# Patient Record
Sex: MALE | Race: WHITE | NOT HISPANIC OR LATINO | Employment: OTHER | RURAL
[De-identification: names, ages, dates, MRNs, and addresses within clinical notes are randomized per-mention and may not be internally consistent; named-entity substitution may affect disease eponyms.]

---

## 2019-03-02 NOTE — PROGRESS NOTES
Elis Dent is a 63 y.o. male.   Problem List  1. COPD  2. Chronic back pain  3. Tobacco smoker   4. Migraine Headache  5. CVA x 2 a  6. History of MI   7. Alcohol abuse     PSHX  -back surgery about 20 years ago   -left arm surgery     SocialHX  -smoke 1 ppd >20 years  -Drinks alcohol about 6 pack a week  -former drug user   -  -26 children  -Worked in construction  -disabled     FamilyHX  -mother - , emphysema, heart disease   -father  -     Pt is 62 yo male who I am seeing for the first time. He is here to establish. He has a history of COPD. He used to see Dr. Garcia but stopped seeing due to not giving him pain medication.  He was taking Lortab.  He does smoke 1 ppd.  He states is back is in pain and he has a headache.I do not have records here today. He has history of CVA and history of MI.  He is currenlty disbaled and lives alone. He has a family history of emphysema fever.   HE has not seen PCP in years. He currently has a .  He states that today his back, his chest, am. He has not seen Cardiologist in years. He lives alone.  His past surgeries include back surgery 20 years ago and left arm surgery. He is .     COPD   This is a new problem. The current episode started more than 1 year ago. The problem occurs constantly. Associated symptoms include arthralgias, chest pain, fatigue, headaches and weakness. Pertinent negatives include no abdominal pain, anorexia, change in bowel habit, chills, congestion, coughing, diaphoresis, fever, joint swelling, myalgias, nausea, neck pain or numbness.        The following portions of the patient's history were reviewed and updated as appropriate: allergies, current medications, past family history, past medical history, past social history, past surgical history and problem list.  Patient Active Problem List   Diagnosis   • Annual physical exam   • General medical examination   • Encounter for screening for diabetes  "mellitus   • Encounter for screening for cardiovascular disorders   • Encounter for screening for endocrine disorder   • Chronic obstructive pulmonary disease (CMS/[facility])   • Tobacco abuse counseling   • Tobacco user   • Dyspnea   • Back pain   • Elevated blood pressure reading   • Alcohol abuse counseling and surveillance   • Alcohol abuse   • Tachycardia     Current Outpatient Medications on File Prior to Visit   Medication Sig Dispense Refill   • [DISCONTINUED] albuterol sulfate  (90 Base) MCG/ACT inhaler Inhale 2 puffs Every 6 (Six) Hours As Needed for Wheezing.       No current facility-administered medications on file prior to visit.        Review of Systems   Constitutional: Positive for activity change and fatigue. Negative for chills, diaphoresis and fever.   HENT: Negative for congestion.    Eyes: Negative.    Respiratory: Positive for chest tightness and shortness of breath. Negative for cough.    Cardiovascular: Positive for chest pain.   Gastrointestinal: Negative.  Negative for abdominal pain, anorexia, change in bowel habit and nausea.   Endocrine: Negative.    Genitourinary: Negative.    Musculoskeletal: Positive for arthralgias, back pain and gait problem. Negative for joint swelling, myalgias and neck pain.   Neurological: Positive for dizziness, weakness, light-headedness and headaches. Negative for numbness.   Psychiatric/Behavioral: Positive for agitation.       Objective    /86   Pulse 107   Temp 98.6 °F (37 °C)   Ht 165.1 cm (65\")   Wt 65 kg (143 lb 3.2 oz)   SpO2 96%   BMI 23.83 kg/m²     EKG  On 3/7/19 showed NSR with septal infarct, age undetermined, abnormal EKG  No results found for any previous visit.       Physical Exam   Constitutional: He is oriented to person, place, and time. He appears well-developed and well-nourished. No distress.   HENT:   Head: Normocephalic and atraumatic.   Right Ear: External ear normal.   Left Ear: External ear normal.   Eyes: Conjunctivae " and EOM are normal. Pupils are equal, round, and reactive to light. Right eye exhibits no discharge. Left eye exhibits no discharge. No scleral icterus.   Neck: Normal range of motion. Neck supple. No JVD present. No tracheal deviation present. No thyromegaly present.   Cardiovascular: Normal rate, regular rhythm and normal heart sounds.   Pulmonary/Chest: Effort normal. No stridor. No respiratory distress. He has wheezes.   Decreased breath sounds ,wheezing in all lung fileds   Abdominal: Soft. He exhibits no distension and no mass. There is no tenderness. There is no guarding.   Musculoskeletal: He exhibits no edema or deformity.        Lumbar back: He exhibits decreased range of motion, tenderness, bony tenderness, pain and spasm.        Back:    Lymphadenopathy:     He has no cervical adenopathy.   Neurological: He is alert and oriented to person, place, and time. He displays normal reflexes. No cranial nerve deficit. Coordination normal.   Skin: Skin is warm and dry. No rash noted. He is not diaphoretic. No erythema. No pallor.   Psychiatric: He has a normal mood and affect. His behavior is normal.   Nursing note and vitals reviewed.      Assessment/Plan   Problems Addressed this Visit        Cardiovascular and Mediastinum    Elevated blood pressure reading    Tachycardia    Relevant Orders    ECG 12 Lead (Completed)       Respiratory    Chronic obstructive pulmonary disease (CMS/HCC) - Primary    Relevant Medications    albuterol sulfate  (90 Base) MCG/ACT inhaler    albuterol (ACCUNEB) 1.25 MG/3ML nebulizer solution    Other Relevant Orders    Ambulatory Referral to Pulmonology (Completed)    Dyspnea    Relevant Orders    XR Chest PA & Lateral    Ambulatory Referral to Pulmonology (Completed)       Nervous and Auditory    Back pain    Relevant Orders    XR Spine Lumbar AP & Lateral       Other    Annual physical exam    General medical examination    Relevant Orders    CBC Auto Differential     Comprehensive Metabolic Panel    Hemoglobin A1c    Lipid Panel    TSH    T4, Free    T3, Free    Vitamin D 25 Hydroxy    Encounter for screening for diabetes mellitus    Encounter for screening for cardiovascular disorders    Encounter for screening for endocrine disorder    Tobacco abuse counseling    Tobacco user    Alcohol abuse counseling and surveillance    Alcohol abuse      Other Visit Diagnoses     History of MI (myocardial infarction)        History of CVA (cerebrovascular accident)          -recommend labwork  -need records from DR. Garcia previous PC  -advised pt will not fill out controlled medication such as Lortab until more information is provided. In meantime will get x-ray of lumbar spine  -dyspnea/COPD - will get chest x-ray . Will also refer to DR. [place] for Pulmonary function test. Gave prescription for nebulizer and solution today.    -recheck in 2-4 weeks  -tachycardia - EKG today   -pt allergic to aspirin   -for chest pain abnormal EKG recommend pt go to ER or call 911 if symptom severe. EKG showed NSR with septal infarct. Recommend Cardiology referral. Dr. Malhotra consultation appreciated  -counseld pt to quit alcohol and tobacco cessation >5 minutes. Gave information to quit smoking. Gave prescription for nicotine patch   -alcohol abuse -gave information on alcohol abuse   -advised to go to ER of call 911 if symptoms worrisome or severe  - he states he has high blood pressure and it is elevated today. Will start on lisionpril-HCTZ 20-12.5 mg daily - recommend low sodium diet . Gave information provided   -advised pt to be safe and call with any questions or concerns. All qusetion addressed today         This document has been electronically signed by Toby Harrell MD on March 7, 2019 10:55 AM

## 2019-03-07 ENCOUNTER — OFFICE VISIT (OUTPATIENT)
Dept: FAMILY MEDICINE CLINIC | Facility: CLINIC | Age: 64
End: 2019-03-07

## 2019-03-07 VITALS
WEIGHT: 143.2 LBS | HEIGHT: 65 IN | DIASTOLIC BLOOD PRESSURE: 86 MMHG | OXYGEN SATURATION: 96 % | SYSTOLIC BLOOD PRESSURE: 180 MMHG | TEMPERATURE: 98.6 F | BODY MASS INDEX: 23.86 KG/M2 | HEART RATE: 107 BPM

## 2019-03-07 DIAGNOSIS — I25.2 HISTORY OF MI (MYOCARDIAL INFARCTION): ICD-10-CM

## 2019-03-07 DIAGNOSIS — R00.0 TACHYCARDIA: ICD-10-CM

## 2019-03-07 DIAGNOSIS — R06.00 DYSPNEA, UNSPECIFIED TYPE: ICD-10-CM

## 2019-03-07 DIAGNOSIS — Z00.00 GENERAL MEDICAL EXAMINATION: ICD-10-CM

## 2019-03-07 DIAGNOSIS — Z72.0 TOBACCO USER: ICD-10-CM

## 2019-03-07 DIAGNOSIS — J44.9 CHRONIC OBSTRUCTIVE PULMONARY DISEASE, UNSPECIFIED COPD TYPE (HCC): ICD-10-CM

## 2019-03-07 DIAGNOSIS — R07.9 CHEST PAIN, UNSPECIFIED TYPE: Primary | ICD-10-CM

## 2019-03-07 DIAGNOSIS — R94.31 ABNORMAL EKG: ICD-10-CM

## 2019-03-07 DIAGNOSIS — F10.10 ALCOHOL ABUSE: ICD-10-CM

## 2019-03-07 DIAGNOSIS — Z86.73 HISTORY OF CVA (CEREBROVASCULAR ACCIDENT): ICD-10-CM

## 2019-03-07 DIAGNOSIS — Z13.29 ENCOUNTER FOR SCREENING FOR ENDOCRINE DISORDER: ICD-10-CM

## 2019-03-07 DIAGNOSIS — M54.9 BACK PAIN, UNSPECIFIED BACK LOCATION, UNSPECIFIED BACK PAIN LATERALITY, UNSPECIFIED CHRONICITY: ICD-10-CM

## 2019-03-07 DIAGNOSIS — Z00.00 ANNUAL PHYSICAL EXAM: ICD-10-CM

## 2019-03-07 DIAGNOSIS — Z71.41 ALCOHOL ABUSE COUNSELING AND SURVEILLANCE: ICD-10-CM

## 2019-03-07 DIAGNOSIS — R03.0 ELEVATED BLOOD PRESSURE READING: ICD-10-CM

## 2019-03-07 DIAGNOSIS — Z13.1 ENCOUNTER FOR SCREENING FOR DIABETES MELLITUS: ICD-10-CM

## 2019-03-07 DIAGNOSIS — Z13.6 ENCOUNTER FOR SCREENING FOR CARDIOVASCULAR DISORDERS: ICD-10-CM

## 2019-03-07 DIAGNOSIS — Z71.6 TOBACCO ABUSE COUNSELING: ICD-10-CM

## 2019-03-07 PROCEDURE — 99214 OFFICE O/P EST MOD 30 MIN: CPT | Performed by: FAMILY MEDICINE

## 2019-03-07 PROCEDURE — 93010 ELECTROCARDIOGRAM REPORT: CPT | Performed by: INTERNAL MEDICINE

## 2019-03-07 PROCEDURE — 93005 ELECTROCARDIOGRAM TRACING: CPT | Performed by: FAMILY MEDICINE

## 2019-03-07 RX ORDER — NICOTINE 21 MG/24HR
1 PATCH, TRANSDERMAL 24 HOURS TRANSDERMAL EVERY 24 HOURS
Qty: 30 PATCH | Refills: 3 | Status: SHIPPED | OUTPATIENT
Start: 2019-03-07

## 2019-03-07 RX ORDER — ALBUTEROL SULFATE 90 UG/1
2 AEROSOL, METERED RESPIRATORY (INHALATION) EVERY 6 HOURS PRN
COMMUNITY
End: 2019-03-07 | Stop reason: SDUPTHER

## 2019-03-07 RX ORDER — ALBUTEROL SULFATE 1.25 MG/3ML
1 SOLUTION RESPIRATORY (INHALATION) EVERY 6 HOURS PRN
Qty: 1 VIAL | Refills: 3 | Status: SHIPPED | OUTPATIENT
Start: 2019-03-07 | End: 2019-07-10 | Stop reason: SDUPTHER

## 2019-03-07 RX ORDER — ALBUTEROL SULFATE 90 UG/1
2 AEROSOL, METERED RESPIRATORY (INHALATION) EVERY 6 HOURS PRN
Qty: 1 INHALER | Refills: 3 | Status: SHIPPED | OUTPATIENT
Start: 2019-03-07 | End: 2019-07-10 | Stop reason: SDUPTHER

## 2019-03-07 RX ORDER — LISINOPRIL AND HYDROCHLOROTHIAZIDE 20; 12.5 MG/1; MG/1
1 TABLET ORAL DAILY
Qty: 30 TABLET | Refills: 3 | Status: SHIPPED | OUTPATIENT
Start: 2019-03-07 | End: 2019-04-22

## 2019-03-07 NOTE — PATIENT INSTRUCTIONS
Alcohol Withdrawal  Alcohol withdrawal is a group of symptoms that can develop when a person who drinks heavily and regularly stops drinking or drinks less.  What are the causes?  Heavy and regular drinking can cause chemicals that send signals from the brain to the body (neurotransmitters) to deactivate. Alcohol withdrawal develops when deactivated neurotransmitters reactivate because a person stops drinking or drinks less.  What increases the risk?  The more a person drinks and the longer he or she drinks, the greater the risk of alcohol withdrawal. Severe withdrawal is more likely to develop in someone who:  · Had severe alcohol withdrawal in the past.  · Had a seizure during a previous episode of alcohol withdrawal.  · Is elderly.  · Is pregnant.  · Has been abusing drugs.  · Has other medical problems, including:  ? Infection.  ? Heart, lung, or liver disease.  ? Seizures.  ? Mental health problems.    What are the signs or symptoms?  Symptoms of this condition can be mild to moderate, or they can be severe.  Mild to moderate symptoms may include:  · Fatigue.  · Nightmares.  · Trouble sleeping.  · Depression.  · Anxiety.  · Inability to think clearly.  · Mood swings.  · Irritability.  · Loss of appetite.  · Nausea or vomiting.  · Clammy skin.  · Extreme sweating.  · Rapid heartbeat.  · Shakiness.  · Uncontrollable shaking (tremor).    Severe symptoms may include:  · Fever.  · Seizures.  · Severe confusion.  · Feeling or seeing things that are not there (hallucinations).    Symptoms usually begin within eight hours after a person stops drinking or drinks less. They can last for weeks.  How is this diagnosed?  Alcohol withdrawal is diagnosed with a medical history and physical exam. Sometimes, urine and blood tests are also done.  How is this treated?  Treatment may involve:  · Monitoring blood pressure, pulse, and breathing.  · Getting fluids through an IV tube.  · Medicine to reduce anxiety.  · Medicine to  prevent or control seizures.  · Multivitamins and B vitamins.  · Having a health care provider check on you daily.    If symptoms are moderate to severe or if there is a risk of severe withdrawal, treatment may be done at a hospital or treatment center.  Follow these instructions at home:  · Take medicines and vitamin supplements only as directed by your health care provider.  · Do not drink alcohol.  · Have someone stay with you or be available if you need help.  · Drink enough fluid to keep your urine clear or pale yellow.  · Consider joining a 12-step program or another alcohol support group.  Contact a health care provider if:  · Your symptoms get worse or do not go away.  · You cannot keep food or water in your stomach.  · You are struggling with not drinking alcohol.  · You cannot stop drinking alcohol.  Get help right away if:  · You have an irregular heartbeat.  · You have chest pain.  · You have trouble breathing.  · You have symptoms of severe withdrawal, such as:  ? A fever.  ? Seizures.  ? Severe confusion.  ? Hallucinations.  This information is not intended to replace advice given to you by your health care provider. Make sure you discuss any questions you have with your health care provider.  Document Released: 09/27/2006 Document Revised: 04/26/2017 Document Reviewed: 10/06/2015  [employer] Interactive Patient Education © 2018 [employer]  Alcohol Abuse and Nutrition  Alcohol abuse is any pattern of alcohol consumption that harms your health, relationships, or work. Alcohol abuse can affect how your body breaks down and absorbs nutrients from food by causing your liver to work abnormally. Additionally, many people who abuse alcohol do not eat enough carbohydrates, protein, fat, vitamins, and minerals. This can cause poor nutrition (malnutrition) and a lack of nutrients (nutrient deficiencies), which can lead to further complications.  Nutrients that are commonly lacking (deficient) among people who  abuse alcohol include:  · Vitamins.  ? Vitamin A. This is stored in your liver. It is important for your vision, metabolism, and ability to fight off infections (immunity).  ? B vitamins. These include vitamins such as folate, thiamin, and niacin. These are important in new cell growth and maintenance.  ? Vitamin C. This plays an important role in iron absorption, wound healing, and immunity.  ? Vitamin D. This is produced by your liver, but you can also get vitamin D from food. Vitamin D is necessary for your body to absorb and use calcium.  · Minerals.  ? Calcium. This is important for your bones and your heart and blood vessel (cardiovascular) function.  ? Iron. This is important for blood, muscle, and nervous system functioning.  ? Magnesium. This plays an important role in muscle and nerve function, and it helps to control blood sugar and blood pressure.  ? Zinc. This is important for the normal function of your nervous system and digestive system (gastrointestinal tract).    Nutrition is an essential component of therapy for alcohol abuse. Your health care provider or dietitian will work with you to design a plan that can help restore nutrients to your body and prevent potential complications.  What is my plan?  Your dietitian may develop a specific diet plan that is based on your condition and any other complications you may have. A diet plan will commonly include:  · A balanced diet.  ? Grains: 6-8 oz per day.  ? Vegetables: 2-3 cups per day.  ? Fruits: 1-2 cups per day.  ? Meat and other protein: 5-6 oz per day.  ? Dairy: 2-3 cups per day.  · Vitamin and mineral supplements.    What do I need to know about alcohol and nutrition?  · Consume foods that are high in antioxidants, such as grapes, berries, nuts, green tea, and dark green and orange vegetables. This can help to counteract some of the stress that is placed on your liver by consuming alcohol.  · Avoid food and drinks that are high in fat and sugar.  Foods such as sugared soft drinks, salty snack foods, and candy contain empty calories. This means that they lack important nutrients such as protein, fiber, and vitamins.  · Eat frequent meals and snacks. Try to eat 5-6 small meals each day.  · Eat a variety of fresh fruits and vegetables each day. This will help you get plenty of water, fiber, and vitamins in your diet.  · Drink plenty of water and other clear fluids. Try to drink at least 48-64 oz (1.5-2 L) of water per day.  · If you are a vegetarian, eat a variety of protein-rich foods. Pair whole grains with plant-based proteins at meals and snacks to obtain the greatest nutrient benefit from your food. For example, eat rice with beans, put peanut butter on whole-grain toast, or eat oatmeal with sunflower seeds.  · Soak beans and whole grains overnight before cooking. This can help your body to absorb the nutrients more easily.  · Include foods fortified with vitamins and minerals in your diet. Commonly fortified foods include milk, orange juice, cereal, and bread.  · If you are malnourished, your dietitian may recommend a high-protein, high-calorie diet. This may include:  ? 2,000-3,000 calories (kilocalories) per day.  ?  grams of protein per day.  · Your health care provider may recommend a complete nutritional supplement beverage. This can help to restore calories, protein, and vitamins to your body. Depending on your condition, you may be advised to consume this instead of or in addition to meals.  · Limit your intake of caffeine. Replace drinks like coffee and black tea with decaffeinated coffee and herbal tea.  · Eat a variety of foods that are high in omega fatty acids. These include fish, nuts and seeds, and soybeans. These foods may help your liver to recover and may also stabilize your mood.  · Certain medicines may cause changes in your appetite, taste, and weight. Work with your health care provider and dietitian to make any adjustments to  your medicines and diet plan.  · Include other healthy lifestyle choices in your daily routine.  ? Be physically active.  ? Get enough sleep.  ? Spend time doing activities that you enjoy.  · If you are unable to take in enough food and calories by mouth, your health care provider may recommend a feeding tube. This is a tube that passes through your nose and throat, directly into your stomach. Nutritional supplement beverages can be given to you through the feeding tube to help you get the nutrients you need.  · Take vitamin or mineral supplements as recommended by your health care provider.  What foods can I eat?  Grains  Enriched pasta. Enriched rice. Fortified whole-grain bread. Fortified whole-grain cereal. Barley. Brown rice. Quinoa. Millet.  Vegetables  All fresh, frozen, and canned vegetables. Spinach. Kale. Artichoke. Carrots. Winter squash and pumpkin. Sweet potatoes. Broccoli. Cabbage. Cucumbers. Tomatoes. Sweet peppers. Green beans. Peas. Corn.  Fruits  All fresh and frozen fruits. Berries. Grapes. [place]j. Papaya. Guava. Cherries. Apples. Bananas. Peaches. Plums. Pineapple. Watermelon. Cantaloupe. Oranges. Avocado.  Meats and Other Protein Sources  Beef liver. Lean beef. Pork. Fresh and canned chicken. Fresh fish. Oysters. Sardines. Canned tuna. Shrimp. Eggs with yolks. Nuts and seeds. Peanut butter. Beans and lentils. Soybeans. Tofu.  Dairy  Whole, low-fat, and nonfat milk. Whole, low-fat, and nonfat yogurt. Cottage cheese. Sour cream. Hard and soft cheeses.  Beverages  Water. Herbal tea. Decaffeinated coffee. Decaffeinated green tea. 100% fruit juice. 100% vegetable juice. Instant breakfast shakes.  Condiments  Ketchup. Mayonnaise. Mustard. Salad dressing. Barbecue sauce.  Sweets and Desserts  Sugar-free ice cream. Sugar-free pudding. Sugar-free gelatin.  Fats and Oils  Butter. Vegetable oil, flaxseed oil, olive oil, and walnut oil.  Other  Complete nutrition shakes. Protein bars. Sugar-free gum.  The  items listed above may not be a complete list of recommended foods or beverages. Contact your dietitian for more options.  What foods are not recommended?  Grains  Sugar-sweetened breakfast cereals. Flavored instant oatmeal. Fried breads.  Vegetables  Breaded or deep-fried vegetables.  Fruits  Dried fruit with added sugar. Candied fruit. Canned fruit in syrup.  Meats and Other Protein Sources  Breaded or deep-fried meats.  Dairy  Flavored milks. Fried cheese curds or fried cheese sticks.  Beverages  Alcohol. Sugar-sweetened soft drinks. Sugar-sweetened tea. Caffeinated coffee and tea.  Condiments  Sugar. Honey. Agave nectar. Molasses.  Sweets and Desserts  Chocolate. Cake. Cookies. Candy.  Other  Potato chips. Pretzels. Salted nuts. Candied nuts.  The items listed above may not be a complete list of foods and beverages to avoid. Contact your dietitian for more information.  This information is not intended to replace advice given to you by your health care provider. Make sure you discuss any questions you have with your health care provider.  Document Released: 10/12/2006 Document Revised: 04/26/2017 Document Reviewed: 07/21/2015  [employer] Interactive Patient Education © 2018 [employer]    Alcohol Intoxication  Alcohol intoxication occurs when a person no longer thinks clearly or functions well (becomes impaired) after drinking alcohol. Intoxication can occur with even one drink. The level of impairment depends on:  · The amount of alcohol the person had.  · The person's age, gender, and weight.  · How often the person drinks.  · Whether the person has other medical conditions, such as diabetes, seizures, or a heart condition.    Alcohol intoxication can range in severity from mild to severe. The condition can be dangerous, especially when caused by drinking large amounts of alcohol in a short period of time (binge drinking) or if the person also took certain prescription medicines or recreational drugs.  What  are the signs or symptoms?  Symptoms of mild alcohol intoxication include:  · Feeling relaxed or sleepy.  · Mild difficulty with:  ? Coordination.  ? Speech.  ? Memory.  ? Attention.    Symptoms of moderate alcohol intoxication include:  · Extreme emotions, such as anger or sadness.  · Moderate difficulty with:  ? Coordination.  ? Speech.  ? Memory.  ? Attention.    Symptoms of severe alcohol intoxication include:  · Passing out.  · Vomiting.  · Confusion.  · Slow breathing.  · Coma.  · Severe difficulty with:  ? Coordination.  ? Speech.  ? Memory.  ? Attention.    Intoxication, especially in people who are not exposed to alcohol often can progress from mild to severe quickly, and may even cause coma or death.  How is this diagnosed?  This condition may be diagnosed based on:  · A medical history.  · A physical exam.  · A blood test that measures the concentration of alcohol in the blood (blood alcohol content, or RU).  · Whether there is a smell of alcohol on the breath.    Your health care provider will ask you how much alcohol you drank and what kind of alcohol you had.  How is this treated?  Usually, treatment is not needed for this condition. Most of the effects of alcohol are temporary and go away as the alcohol naturally leaves the body. Your health care provider may recommend monitoring until the alcohol level starts to drop and it is safe to go home. You may also get fluids through an IV tube to help prevent dehydration. If the intoxication is severe, a breathing machine called a ventilator may be needed to support your breathing.  Follow these instructions at home:  · Do not drive after drinking alcohol.  · Have someone stay with you while you are intoxicated. You should not be left alone.  · Stay hydrated. Drink enough fluid to keep your urine clear or pale yellow.  · Avoid caffeine because it can dehydrate you.  · Take over-the-counter and prescription medicines only as told by your health care  provider.  How is this prevented?  To prevent alcohol intoxication:  · Limit alcohol intake to no more than 1 drink a day for nonpregnant women and 2 drinks a day for men. One drink equals 12 oz of beer, 5 oz of wine, or 1½ oz of hard liquor.  · Do not drink alcohol on an empty stomach.  · Avoid drinking alcohol if:  ? You are under the legal drinking age.  ? You are pregnant or may be pregnant.  ? You are taking medicines that should not be taken with alcohol.  ? Your drinking causes your medical condition to get worse.  ? You need to drive or perform activities that require your attention.  ? You have substance use disorder.    To prevent potentially serious complications of alcohol intoxication, seek immediate medical care if you or someone you know has signs of moderate or severe alcohol intoxication. These include:  · Moderate or severe difficulty with:  ? Coordination.  ? Speech.  ? Memory.  ? Attention.  · Passing out.  · Confusion.  · Vomiting.    Do not leave someone alone if he or she is intoxicated.  Contact a health care provider if:  · You do not feel better after a few days.  · You are having problems at work, at school, or at home due to drinking.  Get help right away if:  · You become shaky when you try to stop drinking.  · You shake uncontrollably (have a seizure).  · You vomit blood. Blood in vomit may look bright red, or it may look like coffee grounds.  · You have blood in your stool. Blood in stool may be bright red, or it may make stool appear black and tarry and make it smell bad.  · You become light-headed or you faint.  If you ever feel like you may hurt yourself or others, or have thoughts about taking your own life, get help right away. You can go to your nearest emergency department or call:  · Your local emergency services (911 in the [place]  · A suicide crisis helpline, such as the National Suicide Prevention Lifeline at 1-421.288.6956. This is open 24 hours a day.    This information  is not intended to replace advice given to you by your health care provider. Make sure you discuss any questions you have with your health care provider.  Document Released: 09/27/2006 Document Revised: 09/02/2017 Document Reviewed: 09/02/2017  [employer] Interactive Patient Education © 2018 [employer]  Lisinopril tablets  What is this medicine?  LISINOPRIL (lyse IN oh pril) is an ACE inhibitor. This medicine is used to treat high blood pressure and heart failure. It is also used to protect the heart immediately after a heart attack.  This medicine may be used for other purposes; ask your health care provider or pharmacist if you have questions.  COMMON BRAND NAME(S): Prinivil, Zestril  What should I tell my health care provider before I take this medicine?  They need to know if you have any of these conditions:  -diabetes  -heart or blood vessel disease  -kidney disease  -low blood pressure  -previous swelling of the tongue, face, or lips with difficulty breathing, difficulty swallowing, hoarseness, or tightening of the throat  -an unusual or allergic reaction to lisinopril, other ACE inhibitors, insect venom, foods, dyes, or preservatives  -pregnant or trying to get pregnant  -breast-feeding  How should I use this medicine?  Take this medicine by mouth with a glass of water. Follow the directions on your prescription label. You may take this medicine with or without food. If it upsets your stomach, take it with food. Take your medicine at regular intervals. Do not take it more often than directed. Do not stop taking except on your doctor's advice.  Talk to your pediatrician regarding the use of this medicine in children. Special care may be needed. While this drug may be prescribed for children as young as 6 years of age for selected conditions, precautions do apply.  Overdosage: If you think you have taken too much of this medicine contact a poison control center or emergency room at once.  NOTE: This medicine  is only for you. Do not share this medicine with others.  What if I miss a dose?  If you miss a dose, take it as soon as you can. If it is almost time for your next dose, take only that dose. Do not take double or extra doses.  What may interact with this medicine?  Do not take this medicine with any of the following medications:  -hymenoptera venom  -sacubitril; valsartan  This medicines may also interact with the following medications:  -aliskiren  -angiotensin receptor blockers, like losartan or valsartan  -certain medicines for diabetes  -diuretics  -everolimus  -gold compounds  -lithium  -NSAIDs, medicines for pain and inflammation, like ibuprofen or naproxen  -potassium salts or supplements  -salt substitutes  -sirolimus  -temsirolimus  This list may not describe all possible interactions. Give your health care provider a list of all the medicines, herbs, non-prescription drugs, or dietary supplements you use. Also tell them if you smoke, drink alcohol, or use illegal drugs. Some items may interact with your medicine.  What should I watch for while using this medicine?  Visit your doctor or health care professional for regular check ups. Check your blood pressure as directed. Ask your doctor what your blood pressure should be, and when you should contact him or her.  Do not treat yourself for coughs, colds, or pain while you are using this medicine without asking your doctor or health care professional for advice. Some ingredients may increase your blood pressure.  Women should inform their doctor if they wish to become pregnant or think they might be pregnant. There is a potential for serious side effects to an unborn child. Talk to your health care professional or pharmacist for more information.  Check with your doctor or health care professional if you get an attack of severe diarrhea, nausea and vomiting, or if you sweat a lot. The loss of too much body fluid can make it dangerous for you to take this  medicine.  You may get drowsy or dizzy. Do not drive, use machinery, or do anything that needs mental alertness until you know how this drug affects you. Do not stand or sit up quickly, especially if you are an older patient. This reduces the risk of dizzy or fainting spells. Alcohol can make you more drowsy and dizzy. Avoid alcoholic drinks.  Avoid salt substitutes unless you are told otherwise by your doctor or health care professional.  What side effects may I notice from receiving this medicine?  Side effects that you should report to your doctor or health care professional as soon as possible:  -allergic reactions like skin rash, itching or hives, swelling of the hands, feet, face, lips, throat, or tongue  -breathing problems  -signs and symptoms of kidney injury like trouble passing urine or change in the amount of urine  -signs and symptoms of increased potassium like muscle weakness; chest pain; or fast, irregular heartbeat  -signs and symptoms of liver injury like dark yellow or brown urine; general ill feeling or flu-like symptoms; light-colored stools; loss of appetite; nausea; right upper belly pain; unusually weak or tired; yellowing of the eyes or skin  -signs and symptoms of low blood pressure like dizziness; feeling faint or lightheaded, falls; unusually weak or tired  -stomach pain with or without nausea and vomiting  Side effects that usually do not require medical attention (report to your doctor or health care professional if they continue or are bothersome):  -changes in taste  -cough  -dizziness  -fever  -headache  -sensitivity to light  This list may not describe all possible side effects. Call your doctor for medical advice about side effects. You may report side effects to FDA at 8-537-FDA-5936.  Where should I keep my medicine?  Keep out of the reach of children.  Store at room temperature between 15 and 30 degrees C (59 and 86 degrees F). Protect from moisture. Keep container tightly  "closed. Throw away any unused medicine after the expiration date.  NOTE: This sheet is a summary. It may not cover all possible information. If you have questions about this medicine, talk to your doctor, pharmacist, or health care provider.  © 2018 [employer] (2017-02-06 12:52:35)    Heart-Healthy Eating Plan  Heart-healthy meal planning includes:  · Limiting unhealthy fats.  · Increasing healthy fats.  · Making other small dietary changes.    You may need to talk with your doctor or a diet specialist (dietitian) to create an eating plan that is right for you.  What types of fat should I choose?  · Choose healthy fats. These include olive oil and canola oil, flaxseeds, walnuts, almonds, and seeds.  · Eat more omega-3 fats. These include salmon, mackerel, sardines, tuna, flaxseed oil, and ground flaxseeds. Try to eat fish at least twice each week.  · Limit saturated fats.  ? Saturated fats are often found in animal products, such as meats, butter, and cream.  ? Plant sources of saturated fats include palm oil, palm kernel oil, and coconut oil.  · Avoid foods with partially hydrogenated oils in them. These include stick margarine, some tub margarines, cookies, crackers, and other baked goods. These contain trans fats.  What general guidelines do I need to follow?  · Check food labels carefully. Identify foods with trans fats or high amounts of saturated fat.  · Fill one half of your plate with vegetables and green salads. Eat 4-5 servings of vegetables per day. A serving of vegetables is:  ? 1 cup of raw leafy vegetables.  ? ½ cup of raw or cooked cut-up vegetables.  ? ½ cup of vegetable juice.  · Fill one fourth of your plate with whole grains. Look for the word \"whole\" as the first word in the ingredient list.  · Fill one fourth of your plate with lean protein foods.  · Eat 4-5 servings of fruit per day. A serving of fruit is:  ? One medium whole fruit.  ? ¼ cup of dried fruit.  ? ½ cup of fresh, " frozen, or canned fruit.  ? ½ cup of 100% fruit juice.  · Eat more foods that contain soluble fiber. These include apples, broccoli, carrots, beans, peas, and barley. Try to get 20-30 g of fiber per day.  · Eat more home-cooked food. Eat less restaurant, buffet, and fast food.  · Limit or avoid alcohol.  · Limit foods high in starch and sugar.  · Avoid fried foods.  · Avoid frying your food. Try baking, boiling, grilling, or broiling it instead. You can also reduce fat by:  ? Removing the skin from poultry.  ? Removing all visible fats from meats.  ? Skimming the fat off of stews, soups, and gravies before serving them.  ? Steaming vegetables in water or broth.  · Lose weight if you are overweight.  · Eat 4-5 servings of nuts, legumes, and seeds per week:  ? One serving of dried beans or legumes equals ½ cup after being cooked.  ? One serving of nuts equals 1½ ounces.  ? One serving of seeds equals ½ ounce or one tablespoon.  · You may need to keep track of how much salt or sodium you eat. This is especially true if you have high blood pressure. Talk with your doctor or dietitian to get more information.  What foods can I eat?  Grains  Breads, including Arabic, white, jorge, wheat, raisin, rye, oatmeal, and Italian. Tortillas that are neither fried nor made with lard or trans fat. Low-fat rolls, including hotdog and hamburger buns and English muffins. Biscuits. Muffins. Waffles. Pancakes. Light popcorn. Whole-grain cereals. Flatbread. [place] toast. Pretzels. Breadsticks. Rusks. Low-fat snacks. Low-fat crackers, including oyster, saltine, matzo, doug, animal, and rye. Rice and pasta, including brown rice and pastas that are made with whole wheat.  Vegetables  All vegetables.  Fruits  All fruits, but limit coconut.  Meats and Other Protein Sources  Lean, well-trimmed beef, veal, pork, and lamb. Chicken and turkey without skin. All fish and shellfish. Wild duck, rabbit, pheasant, and venison. Egg whites or  low-cholesterol egg substitutes. Dried beans, peas, lentils, and tofu. Seeds and most nuts.  Dairy  Low-fat or nonfat cheeses, including ricotta, string, and mozzarella. Skim or 1% milk that is liquid, powdered, or evaporated. Buttermilk that is made with low-fat milk. Nonfat or low-fat yogurt.  Beverages  Mineral water. Diet carbonated beverages.  Sweets and Desserts  Sherbets and fruit ices. Honey, jam, marmalade, jelly, and syrups. Meringues and gelatins. Pure sugar candy, such as hard candy, jelly beans, gumdrops, mints, marshmallows, and small amounts of dark chocolate. Uriel food cake.  Eat all sweets and desserts in moderation.  Fats and Oils  Nonhydrogenated (trans-free) margarines. Vegetable oils, including soybean, sesame, sunflower, olive, peanut, safflower, corn, canola, and cottonseed. Salad dressings or mayonnaise made with a vegetable oil. Limit added fats and oils that you use for cooking, baking, salads, and as spreads.  Other  Cocoa powder. Coffee and tea. All seasonings and condiments.  The items listed above may not be a complete list of recommended foods or beverages. Contact your dietitian for more options.  What foods are not recommended?  Grains  Breads that are made with saturated or trans fats, oils, or whole milk. Croissants. Butter rolls. Cheese breads. Sweet rolls. Donuts. Buttered popcorn. Chow mein noodles. High-fat crackers, such as cheese or butter crackers.  Meats and Other Protein Sources  Fatty meats, such as hotdogs, short ribs, sausage, spareribs, reddy, rib eye roast or steak, and mutton. High-fat deli meats, such as salami and bologna. Caviar. Domestic duck and goose. Organ meats, such as kidney, liver, sweetbreads, and heart.  Dairy  Cream, sour cream, cream cheese, and creamed cottage cheese. Whole-milk cheeses, including blue (melchor), [place] Luis Armando, Brie, Philip, American, Havarti, Swiss, cheddar, Camembert, and Beryl. Whole or 2% milk that is liquid, evaporated, or  condensed. Whole buttermilk. Cream sauce or high-fat cheese sauce. Yogurt that is made from whole milk.  Beverages  Regular sodas and juice drinks with added sugar.  Sweets and Desserts  Frosting. Pudding. Cookies. Cakes other than lindy food cake. Candy that has milk chocolate or white chocolate, hydrogenated fat, butter, coconut, or unknown ingredients. Buttered syrups. Full-fat ice cream or ice cream drinks.  Fats and Oils  Gravy that has suet, meat fat, or shortening. Cocoa butter, hydrogenated oils, palm oil, coconut oil, palm kernel oil. These can often be found in baked products, candy, fried foods, nondairy creamers, and whipped toppings. Solid fats and shortenings, including reddy fat, salt pork, lard, and butter. Nondairy cream substitutes, such as coffee creamers and sour cream substitutes. Salad dressings that are made of unknown oils, cheese, or sour cream.  The items listed above may not be a complete list of foods and beverages to avoid. Contact your dietitian for more information.  This information is not intended to replace advice given to you by your health care provider. Make sure you discuss any questions you have with your health care provider.  Document Released: 06/18/2013 Document Revised: 05/25/2017 Document Reviewed: 06/11/2015  [employer] Interactive Patient Education © 2018 [employer]    Low-Sodium Eating Plan  Sodium, which is an element that makes up salt, helps you maintain a healthy balance of fluids in your body. Too much sodium can increase your blood pressure and cause fluid and waste to be held in your body.  Your health care provider or dietitian may recommend following this plan if you have high blood pressure (hypertension), kidney disease, liver disease, or heart failure. Eating less sodium can help lower your blood pressure, reduce swelling, and protect your heart, liver, and kidneys.  What are tips for following this plan?  General guidelines  · Most people on this plan  "should limit their sodium intake to 1,500-2,000 mg (milligrams) of sodium each day.  Reading food labels  · The Nutrition Facts label lists the amount of sodium in one serving of the food. If you eat more than one serving, you must multiply the listed amount of sodium by the number of servings.  · Choose foods with less than 140 mg of sodium per serving.  · Avoid foods with 300 mg of sodium or more per serving.  Shopping  · Look for lower-sodium products, often labeled as \"low-sodium\" or \"no salt added.\"  · Always check the sodium content even if foods are labeled as \"unsalted\" or \"no salt added\".  · Buy fresh foods.  ? Avoid canned foods and premade or frozen meals.  ? Avoid canned, cured, or processed meats  · Buy breads that have less than 80 mg of sodium per slice.  Cooking  · Eat more home-cooked food and less restaurant, buffet, and fast food.  · Avoid adding salt when cooking. Use salt-free seasonings or herbs instead of table salt or sea salt. Check with your health care provider or pharmacist before using salt substitutes.  · Cook with plant-based oils, such as canola, sunflower, or olive oil.  Meal planning  · When eating at a restaurant, ask that your food be prepared with less salt or no salt, if possible.  · Avoid foods that contain MSG (monosodium glutamate). MSG is sometimes added to Chinese food, bouillon, and some canned foods.  What foods are recommended?  The items listed may not be a complete list. Talk with your dietitian about what dietary choices are best for you.  Grains  Low-sodium cereals, including oats, puffed wheat and rice, and shredded wheat. Low-sodium crackers. Unsalted rice. Unsalted pasta. Low-sodium bread. Whole-grain breads and whole-grain pasta.  Vegetables  Fresh or frozen vegetables. \"No salt added\" canned vegetables. \"No salt added\" tomato sauce and paste. Low-sodium or reduced-sodium tomato and vegetable juice.  Fruits  Fresh, frozen, or canned fruit. Fruit juice.  Meats and " other protein foods  Fresh or frozen (no salt added) meat, poultry, seafood, and fish. Low-sodium canned tuna and salmon. Unsalted nuts. Dried peas, beans, and lentils without added salt. Unsalted canned beans. Eggs. Unsalted nut butters.  Dairy  Milk. Soy milk. Cheese that is naturally low in sodium, such as ricotta cheese, fresh mozzarella, or Swiss cheese Low-sodium or reduced-sodium cheese. Cream cheese. Yogurt.  Fats and oils  Unsalted butter. Unsalted margarine with no trans fat. Vegetable oils such as canola or olive oils.  Seasonings and other foods  Fresh and dried herbs and spices. Salt-free seasonings. Low-sodium mustard and ketchup. Sodium-free salad dressing. Sodium-free light mayonnaise. Fresh or refrigerated horseradish. Lemon juice. Vinegar. Homemade, reduced-sodium, or low-sodium soups. Unsalted popcorn and pretzels. Low-salt or salt-free chips.  What foods are not recommended?  The items listed may not be a complete list. Talk with your dietitian about what dietary choices are best for you.  Grains  Instant hot cereals. Bread stuffing, pancake, and biscuit mixes. Croutons. Seasoned rice or pasta mixes. Noodle soup cups. Boxed or frozen macaroni and cheese. Regular salted crackers. Self-rising flour.  Vegetables  Sauerkraut, pickled vegetables, and relishes. Olives. French fries. Onion rings. Regular canned vegetables (not low-sodium or reduced-sodium). Regular canned tomato sauce and paste (not low-sodium or reduced-sodium). Regular tomato and vegetable juice (not low-sodium or reduced-sodium). Frozen vegetables in sauces.  Meats and other protein foods  Meat or fish that is salted, canned, smoked, spiced, or pickled. Sadler, ham, sausage, hotdogs, corned beef, chipped beef, packaged lunch meats, salt pork, jerky, pickled herring, anchovies, regular canned tuna, sardines, salted nuts.  Dairy  Processed cheese and cheese spreads. Cheese curds. Blue cheese. Feta cheese. String cheese. Regular cottage  cheese. Buttermilk. Canned milk.  Fats and oils  Salted butter. Regular margarine. Ghee. Sadler fat.  Seasonings and other foods  Onion salt, garlic salt, seasoned salt, table salt, and sea salt. Canned and packaged gravies. Worcestershire sauce. Tartar sauce. Barbecue sauce. Teriyaki sauce. Soy sauce, including reduced-sodium. Steak sauce. Fish sauce. Oyster sauce. Cocktail sauce. Horseradish that you find on the shelf. Regular ketchup and mustard. Meat flavorings and tenderizers. Bouillon cubes. Hot sauce and Tabasco sauce. Premade or packaged marinades. Premade or packaged taco seasonings. Relishes. Regular salad dressings. Salsa. Potato and tortilla chips. Corn chips and puffs. Salted popcorn and pretzels. Canned or dried soups. Pizza. Frozen entrees and pot pies.  Summary  · Eating less sodium can help lower your blood pressure, reduce swelling, and protect your heart, liver, and kidneys.  · Most people on this plan should limit their sodium intake to 1,500-2,000 mg (milligrams) of sodium each day.  · Canned, boxed, and frozen foods are high in sodium. Restaurant foods, fast foods, and pizza are also very high in sodium. You also get sodium by adding salt to food.  · Try to cook at home, eat more fresh fruits and vegetables, and eat less fast food, canned, processed, or prepared foods.  This information is not intended to replace advice given to you by your health care provider. Make sure you discuss any questions you have with your health care provider.  Document Released: 06/09/2003 Document Revised: 12/11/2017 Document Reviewed: 12/11/2017  [employer] Interactive Patient Education © 2018 [employer]    Steps to Quit Smoking  Smoking tobacco can be bad for your health. It can also affect almost every organ in your body. Smoking puts you and people around you at risk for many serious long-lasting (chronic) diseases. Quitting smoking is hard, but it is one of the best things that you can do for your health. It  is never too late to quit.  What are the benefits of quitting smoking?  When you quit smoking, you lower your risk for getting serious diseases and conditions. They can include:  · Lung cancer or lung disease.  · Heart disease.  · Stroke.  · Heart attack.  · Not being able to have children (infertility).  · Weak bones (osteoporosis) and broken bones (fractures).    If you have coughing, wheezing, and shortness of breath, those symptoms may get better when you quit. You may also get sick less often. If you are pregnant, quitting smoking can help to lower your chances of having a baby of low birth weight.  What can I do to help me quit smoking?  Talk with your doctor about what can help you quit smoking. Some things you can do (strategies) include:  · Quitting smoking totally, instead of slowly cutting back how much you smoke over a period of time.  · Going to in-person counseling. You are more likely to quit if you go to many counseling sessions.  · Using resources and support systems, such as:  ? Online chats with a counselor.  ? Phone quitlines.  ? Printed self-help materials.  ? Support groups or group counseling.  ? Text messaging programs.  ? Mobile phone apps or applications.  · Taking medicines. Some of these medicines may have nicotine in them. If you are pregnant or breastfeeding, do not take any medicines to quit smoking unless your doctor says it is okay. Talk with your doctor about counseling or other things that can help you.    Talk with your doctor about using more than one strategy at the same time, such as taking medicines while you are also going to in-person counseling. This can help make quitting easier.  What things can I do to make it easier to quit?  Quitting smoking might feel very hard at first, but there is a lot that you can do to make it easier. Take these steps:  · Talk to your family and friends. Ask them to support and encourage you.  · Call phone quitlines, reach out to support groups,  or work with a counselor.  · Ask people who smoke to not smoke around you.  · Avoid places that make you want (trigger) to smoke, such as:  ? Bars.  ? Parties.  ? Smoke-break areas at work.  · Spend time with people who do not smoke.  · Lower the stress in your life. Stress can make you want to smoke. Try these things to help your stress:  ? Getting regular exercise.  ? Deep-breathing exercises.  ? Yoga.  ? Meditating.  ? Doing a body scan. To do this, close your eyes, focus on one area of your body at a time from head to toe, and notice which parts of your body are tense. Try to relax the muscles in those areas.  · Download or buy apps on your mobile phone or tablet that can help you stick to your quit plan. There are many free apps, such as QuitGuide from the CDC (Centers for Disease Control and Prevention). You can find more support from smokefree.gov and other websites.    This information is not intended to replace advice given to you by your health care provider. Make sure you discuss any questions you have with your health care provider.  Document Released: 10/14/2010 Document Revised: 08/15/2017 Document Reviewed: 05/03/2016  [employer] Interactive Patient Education © 2018 [employer]    For more information:    Quit Now [place]  1-800-QUIT-NOW  https://[facility]quitlogix.org/en-[place]/  Steps to Quit Smoking  Smoking tobacco can be harmful to your health and can affect almost every organ in your body. Smoking puts you, and those around you, at risk for developing many serious chronic diseases. Quitting smoking is difficult, but it is one of the best things that you can do for your health. It is never too late to quit.  What are the benefits of quitting smoking?  When you quit smoking, you lower your risk of developing serious diseases and conditions, such as:  · Lung cancer or lung disease, such as COPD.  · Heart disease.  · Stroke.  · Heart attack.  · Infertility.  · Osteoporosis and bone  fractures.  Additionally, symptoms such as coughing, wheezing, and shortness of breath may get better when you quit. You may also find that you get sick less often because your body is stronger at fighting off colds and infections. If you are pregnant, quitting smoking can help to reduce your chances of having a baby of low birth weight.  How do I get ready to quit?  When you decide to quit smoking, create a plan to make sure that you are successful. Before you quit:  · Pick a date to quit. Set a date within the next two weeks to give you time to prepare.  · Write down the reasons why you are quitting. Keep this list in places where you will see it often, such as on your bathroom mirror or in your car or wallet.  · Identify the people, places, things, and activities that make you want to smoke (triggers) and avoid them. Make sure to take these actions:  ¨ Throw away all cigarettes at home, at work, and in your car.  ¨ Throw away smoking accessories, such as ashtrays and lighters.  ¨ Clean your car and make sure to empty the ashtray.  ¨ Clean your home, including curtains and carpets.  · Tell your family, friends, and coworkers that you are quitting. Support from your loved ones can make quitting easier.  · Talk with your health care provider about your options for quitting smoking.  · Find out what treatment options are covered by your health insurance.  What strategies can I use to quit smoking?  Talk with your healthcare provider about different strategies to quit smoking. Some strategies include:  · Quitting smoking altogether instead of gradually lessening how much you smoke over a period of time. Research shows that quitting “cold turkey” is more successful than gradually quitting.  · Attending in-person counseling to help you build problem-solving skills. You are more likely to have success in quitting if you attend several counseling sessions. Even short sessions of 10 minutes can be effective.  · Finding  resources and support systems that can help you to quit smoking and remain smoke-free after you quit. These resources are most helpful when you use them often. They can include:  ¨ Online chats with a counselor.  ¨ Telephone quitlines.  ¨ Printed self-help materials.  ¨ Support groups or group counseling.  ¨ Text messaging programs.  ¨ Mobile phone applications.  · Taking medicines to help you quit smoking. (If you are pregnant or breastfeeding, talk with your health care provider first.) Some medicines contain nicotine and some do not. Both types of medicines help with cravings, but the medicines that include nicotine help to relieve withdrawal symptoms. Your health care provider may recommend:  ¨ Nicotine patches, gum, or lozenges.  ¨ Nicotine inhalers or sprays.  ¨ Non-nicotine medicine that is taken by mouth.  Talk with your health care provider about combining strategies, such as taking medicines while you are also receiving in-person counseling. Using these two strategies together makes you more likely to succeed in quitting than if you used either strategy on its own.  If you are pregnant or breastfeeding, talk with your health care provider about finding counseling or other support strategies to quit smoking. Do not take medicine to help you quit smoking unless told to do so by your health care provider.  What things can I do to make it easier to quit?  Quitting smoking might feel overwhelming at first, but there is a lot that you can do to make it easier. Take these important actions:  · Reach out to your family and friends and ask that they support and encourage you during this time. Call telephone quitlines, reach out to support groups, or work with a counselor for support.  · Ask people who smoke to avoid smoking around you.  · Avoid places that trigger you to smoke, such as bars, parties, or smoke-break areas at work.  · Spend time around people who do not smoke.  · Lessen stress in your life, because  stress can be a smoking trigger for some people. To lessen stress, try:  ¨ Exercising regularly.  ¨ Deep-breathing exercises.  ¨ Yoga.  ¨ Meditating.  ¨ Performing a body scan. This involves closing your eyes, scanning your body from head to toe, and noticing which parts of your body are particularly tense. Purposefully relax the muscles in those areas.  · Download or purchase mobile phone or tablet apps (applications) that can help you stick to your quit plan by providing reminders, tips, and encouragement. There are many free apps, such as QuitGuide from the CDC (Centers for Disease Control and Prevention). You can find other support for quitting smoking (smoking cessation) through smokefree.gov and other websites.  How will I feel when I quit smoking?  Within the first 24 hours of quitting smoking, you may start to feel some withdrawal symptoms. These symptoms are usually most noticeable 2-3 days after quitting, but they usually do not last beyond 2-3 weeks. Changes or symptoms that you might experience include:  · Mood swings.  · Restlessness, anxiety, or irritation.  · Difficulty concentrating.  · Dizziness.  · Strong cravings for sugary foods in addition to nicotine.  · Mild weight gain.  · Constipation.  · Nausea.  · Coughing or a sore throat.  · Changes in how your medicines work in your body.  · A depressed mood.  · Difficulty sleeping (insomnia).  After the first 2-3 weeks of quitting, you may start to notice more positive results, such as:  · Improved sense of smell and taste.  · Decreased coughing and sore throat.  · Slower heart rate.  · Lower blood pressure.  · Clearer skin.  · The ability to breathe more easily.  · Fewer sick days.  Quitting smoking is very challenging for most people. Do not get discouraged if you are not successful the first time. Some people need to make many attempts to quit before they achieve long-term success. Do your best to stick to your quit plan, and talk with your health  care provider if you have any questions or concerns.  This information is not intended to replace advice given to you by your health care provider. Make sure you discuss any questions you have with your health care provider.  Document Released: 12/12/2002 Document Revised: 08/15/2017 Document Reviewed: 05/03/2016  [employer] Interactive Patient Education © 2017 [employer]

## 2019-03-14 ENCOUNTER — TELEPHONE (OUTPATIENT)
Dept: FAMILY MEDICINE CLINIC | Facility: CLINIC | Age: 64
End: 2019-03-14

## 2019-03-14 NOTE — TELEPHONE ENCOUNTER
[facility]. Please call.

## 2019-03-15 ENCOUNTER — TELEPHONE (OUTPATIENT)
Dept: FAMILY MEDICINE CLINIC | Facility: CLINIC | Age: 64
End: 2019-03-15

## 2019-04-06 PROBLEM — R07.9 CHEST PAIN: Status: ACTIVE | Noted: 2019-04-06

## 2019-04-08 ENCOUNTER — OFFICE VISIT (OUTPATIENT)
Dept: FAMILY MEDICINE CLINIC | Facility: CLINIC | Age: 64
End: 2019-04-08

## 2019-04-08 ENCOUNTER — LAB (OUTPATIENT)
Dept: LAB | Facility: HOSPITAL | Age: 64
End: 2019-04-08

## 2019-04-08 VITALS
SYSTOLIC BLOOD PRESSURE: 120 MMHG | DIASTOLIC BLOOD PRESSURE: 70 MMHG | WEIGHT: 139.2 LBS | HEIGHT: 65 IN | OXYGEN SATURATION: 93 % | BODY MASS INDEX: 23.19 KG/M2 | TEMPERATURE: 98 F | HEART RATE: 96 BPM

## 2019-04-08 DIAGNOSIS — R07.9 CHEST PAIN, UNSPECIFIED TYPE: ICD-10-CM

## 2019-04-08 DIAGNOSIS — Z71.41 ALCOHOL ABUSE COUNSELING AND SURVEILLANCE: ICD-10-CM

## 2019-04-08 DIAGNOSIS — I10 ESSENTIAL HYPERTENSION: ICD-10-CM

## 2019-04-08 DIAGNOSIS — Z71.6 TOBACCO ABUSE COUNSELING: Primary | ICD-10-CM

## 2019-04-08 DIAGNOSIS — M54.9 BACK PAIN, UNSPECIFIED BACK LOCATION, UNSPECIFIED BACK PAIN LATERALITY, UNSPECIFIED CHRONICITY: ICD-10-CM

## 2019-04-08 DIAGNOSIS — Z13.6 ENCOUNTER FOR SCREENING FOR CARDIOVASCULAR DISORDERS: ICD-10-CM

## 2019-04-08 DIAGNOSIS — F10.10 ALCOHOL ABUSE: ICD-10-CM

## 2019-04-08 DIAGNOSIS — Z72.0 TOBACCO USER: ICD-10-CM

## 2019-04-08 DIAGNOSIS — Z13.1 ENCOUNTER FOR SCREENING FOR DIABETES MELLITUS: ICD-10-CM

## 2019-04-08 DIAGNOSIS — Z13.29 ENCOUNTER FOR SCREENING FOR ENDOCRINE DISORDER: ICD-10-CM

## 2019-04-08 DIAGNOSIS — R00.0 TACHYCARDIA: ICD-10-CM

## 2019-04-08 DIAGNOSIS — Z00.00 GENERAL MEDICAL EXAMINATION: ICD-10-CM

## 2019-04-08 DIAGNOSIS — J44.9 CHRONIC OBSTRUCTIVE PULMONARY DISEASE, UNSPECIFIED COPD TYPE (HCC): ICD-10-CM

## 2019-04-08 PROCEDURE — 84439 ASSAY OF FREE THYROXINE: CPT

## 2019-04-08 PROCEDURE — 82306 VITAMIN D 25 HYDROXY: CPT

## 2019-04-08 PROCEDURE — 84481 FREE ASSAY (FT-3): CPT

## 2019-04-08 PROCEDURE — 99214 OFFICE O/P EST MOD 30 MIN: CPT | Performed by: FAMILY MEDICINE

## 2019-04-08 PROCEDURE — 80061 LIPID PANEL: CPT

## 2019-04-08 PROCEDURE — 83036 HEMOGLOBIN GLYCOSYLATED A1C: CPT

## 2019-04-08 PROCEDURE — 80050 GENERAL HEALTH PANEL: CPT

## 2019-04-08 RX ORDER — SODIUM CHLORIDE 1000 MG
1 TABLET, SOLUBLE MISCELLANEOUS DAILY
COMMUNITY
Start: 2019-03-13 | End: 2019-10-11 | Stop reason: SDUPTHER

## 2019-04-08 RX ORDER — PANTOPRAZOLE SODIUM 40 MG/1
40 TABLET, DELAYED RELEASE ORAL DAILY
COMMUNITY
Start: 2019-03-13 | End: 2019-07-08 | Stop reason: SDUPTHER

## 2019-04-09 LAB
25(OH)D3 SERPL-MCNC: 28.3 NG/ML (ref 30–100)
ALBUMIN SERPL-MCNC: 4.8 G/DL (ref 3.5–5.2)
ALBUMIN/GLOB SERPL: 1.3 G/DL
ALP SERPL-CCNC: 63 U/L (ref 39–117)
ALT SERPL W P-5'-P-CCNC: 18 U/L (ref 1–41)
ANION GAP SERPL CALCULATED.3IONS-SCNC: 19.7 MMOL/L
AST SERPL-CCNC: 40 U/L (ref 1–40)
BASOPHILS # BLD AUTO: 0.06 10*3/MM3 (ref 0–0.2)
BASOPHILS NFR BLD AUTO: 1.4 % (ref 0–1.5)
BILIRUB SERPL-MCNC: 0.4 MG/DL (ref 0.2–1.2)
BUN BLD-MCNC: 10 MG/DL (ref 8–23)
BUN/CREAT SERPL: 13.5 (ref 7–25)
CALCIUM SPEC-SCNC: 9.1 MG/DL (ref 8.6–10.5)
CHLORIDE SERPL-SCNC: 90 MMOL/L (ref 98–107)
CHOLEST SERPL-MCNC: 165 MG/DL (ref 0–200)
CO2 SERPL-SCNC: 23.3 MMOL/L (ref 22–29)
CREAT BLD-MCNC: 0.74 MG/DL (ref 0.76–1.27)
DEPRECATED RDW RBC AUTO: 46.5 FL (ref 37–54)
EOSINOPHIL # BLD AUTO: 0.13 10*3/MM3 (ref 0–0.4)
EOSINOPHIL NFR BLD AUTO: 2.9 % (ref 0.3–6.2)
ERYTHROCYTE [DISTWIDTH] IN BLOOD BY AUTOMATED COUNT: 13.6 % (ref 12.3–15.4)
GFR SERPL CREATININE-BSD FRML MDRD: 107 ML/MIN/1.73
GLOBULIN UR ELPH-MCNC: 3.8 GM/DL
GLUCOSE BLD-MCNC: 71 MG/DL (ref 65–99)
HBA1C MFR BLD: 4.78 % (ref 4.8–5.6)
HCT VFR BLD AUTO: 41.5 % (ref 37.5–51)
HDLC SERPL-MCNC: 115 MG/DL (ref 40–60)
HGB BLD-MCNC: 14 G/DL (ref 13–17.7)
IMM GRANULOCYTES # BLD AUTO: 0.01 10*3/MM3 (ref 0–0.05)
IMM GRANULOCYTES NFR BLD AUTO: 0.2 % (ref 0–0.5)
LDLC SERPL CALC-MCNC: 41 MG/DL (ref 0–100)
LDLC/HDLC SERPL: 0.36 {RATIO}
LYMPHOCYTES # BLD AUTO: 1.06 10*3/MM3 (ref 0.7–3.1)
LYMPHOCYTES NFR BLD AUTO: 23.9 % (ref 19.6–45.3)
MCH RBC QN AUTO: 31.5 PG (ref 26.6–33)
MCHC RBC AUTO-ENTMCNC: 33.7 G/DL (ref 31.5–35.7)
MCV RBC AUTO: 93.5 FL (ref 79–97)
MONOCYTES # BLD AUTO: 0.44 10*3/MM3 (ref 0.1–0.9)
MONOCYTES NFR BLD AUTO: 9.9 % (ref 5–12)
NEUTROPHILS # BLD AUTO: 2.74 10*3/MM3 (ref 1.4–7)
NEUTROPHILS NFR BLD AUTO: 61.7 % (ref 42.7–76)
NRBC BLD AUTO-RTO: 0 /100 WBC (ref 0–0)
PLATELET # BLD AUTO: 119 10*3/MM3 (ref 140–450)
PMV BLD AUTO: 12.2 FL (ref 6–12)
POTASSIUM BLD-SCNC: 4.2 MMOL/L (ref 3.5–5.2)
PROT SERPL-MCNC: 8.6 G/DL (ref 6–8.5)
RBC # BLD AUTO: 4.44 10*6/MM3 (ref 4.14–5.8)
SODIUM BLD-SCNC: 133 MMOL/L (ref 136–145)
T3FREE SERPL-MCNC: 3.69 PG/ML (ref 2–4.4)
T4 FREE SERPL-MCNC: 1.28 NG/DL (ref 0.93–1.7)
TRIGL SERPL-MCNC: 45 MG/DL (ref 0–150)
TSH SERPL DL<=0.05 MIU/L-ACNC: 1.25 MIU/ML (ref 0.27–4.2)
VLDLC SERPL-MCNC: 9 MG/DL (ref 5–40)
WBC NRBC COR # BLD: 4.44 10*3/MM3 (ref 3.4–10.8)

## 2019-04-16 ENCOUNTER — TELEPHONE (OUTPATIENT)
Dept: FAMILY MEDICINE CLINIC | Facility: CLINIC | Age: 64
End: 2019-04-16

## 2019-04-16 NOTE — TELEPHONE ENCOUNTER
----- Message from Toby Harrell MD sent at 4/12/2019  9:31 PM CDT -----  Call pt      Thyroid studies normal     Vitamin D is low and recommend pt start on VItamin D3 50,000 units once a week with 4 pills and 3 refills    Lipid panel stable. REcommend pt start on lipitor 20 mg PO qhs. Give 30 pills and 3 refills    [id number] normal    Kidney funciton and liver function normal    Pt is not anemic. But platelets low.  Recommend Hematology referral. Let me know if pt is okay with this and I will put in referral    Recheck on next visit. Thanks  Unable to reach pt,no answer,no voice mail

## 2019-04-20 PROBLEM — K76.0 FATTY LIVER: Status: ACTIVE | Noted: 2019-04-20

## 2019-04-20 PROBLEM — E78.5 HYPERLIPIDEMIA: Status: ACTIVE | Noted: 2019-04-20

## 2019-04-20 PROBLEM — D69.6 THROMBOCYTOPENIA (HCC): Status: ACTIVE | Noted: 2019-04-20

## 2019-04-20 NOTE — PROGRESS NOTES
Subjective:  Khadar Dent is a 64 y.o. male who presents for       Patient Active Problem List   Diagnosis   • Annual physical exam   • General medical examination   • Encounter for screening for diabetes mellitus   • Encounter for screening for cardiovascular disorders   • Encounter for screening for endocrine disorder   • Chronic obstructive pulmonary disease (CMS/HCC)   • Tobacco abuse counseling   • Tobacco user   • Dyspnea   • Back pain   • Elevated blood pressure reading   • Alcohol abuse counseling and surveillance   • Alcohol abuse   • Tachycardia   • Chest pain   • Essential hypertension   • Thrombocytopenia (CMS/HCC)   • Hyperlipidemia           Current Outpatient Medications:   •  albuterol (ACCUNEB) 1.25 MG/3ML nebulizer solution, Take 3 mL by nebulization Every 6 (Six) Hours As Needed for Wheezing., Disp: 1 vial, Rfl: 3  •  albuterol sulfate  (90 Base) MCG/ACT inhaler, Inhale 2 puffs Every 6 (Six) Hours As Needed for Wheezing., Disp: 1 inhaler, Rfl: 3  •  lisinopril-hydrochlorothiazide (PRINZIDE,ZESTORETIC) 20-12.5 MG per tablet, Take 1 tablet by mouth Daily., Disp: 30 tablet, Rfl: 3  •  nicotine (NICODERM CQ) 21 MG/24HR patch, Place 1 patch on the skin as directed by provider Daily., Disp: 30 patch, Rfl: 3  •  pantoprazole (PROTONIX) 40 MG EC tablet, Take 40 mg by mouth Daily., Disp: , Rfl:   •  sodium chloride 1 g tablet, Take 1 g by mouth Daily., Disp: , Rfl:     HPI     PT is 65 yo male with history of multiple joitn pain, overweight,  Fatty liver, alcohol abuse, tobacco use, Hypertension, COPD, GERD coming in for recheck.  I have recent Discharge records from [facility]. PT was admitted on 3/10/19 and discharged on 3/12/19 for nausea vomiting/severe dehydration/acute renal failure. Pt got better. He was set up with Pulmonology and Cardiology referral.  Pt recently had labwork. That showed low vitamin D, thyroid studies normal . LDL is at 41 with HDL at 115. CMP showed normal renal and liver  function. CBC showed thrombocytopenia at 119. With normal hemoglobin.  On discharge summary CT of abdomen with contrast showed mild fatty liver disease. He continues to drink alcohol and a 6 pack of beer would last him for a day and half.  He has been drinking >40 years.    He recently saw Dr. Spain and was started on QVAR along with nebulizer. He is down to 5 cigarettes a day. He has been using the nicotine patch.      Chest Pain    The current episode started more than 1 year ago. The onset quality is gradual. The problem occurs intermittently. The problem has been waxing and waning. The pain is present in the substernal region and lateral region. The pain is at a severity of 5/10. The pain is moderate. The quality of the pain is described as numbness, pressure and tightness. Associated symptoms include back pain, dizziness, exertional chest pressure, headaches, numbness, palpitations, shortness of breath and weakness. Pertinent negatives include no abdominal pain, claudication, cough, diaphoresis, fever, hemoptysis, irregular heartbeat, leg pain, lower extremity edema, malaise/fatigue, nausea, near-syncope, orthopnea, PND, sputum production, syncope or vomiting. The pain is aggravated by breathing, coughing, deep breathing, movement, walking and exertion. The treatment provided no relief. Risk factors include alcohol intake, being elderly, lack of exercise, smoking/tobacco exposure, sedentary lifestyle and stress.   His past medical history is significant for hypertension.   Pertinent negatives for past medical history include no aneurysm, no anxiety/panic attacks, no aortic aneurysm, no aortic dissection, no arrhythmia, no bicuspid aortic valve, no CAD, no cancer, no congenital heart disease, no connective tissue disease, no COPD, no CHF, no diabetes, no DVT, no hyperhomocysteinemia, no hyperlipidemia, no Kawasaki disease, no Marfan's syndrome, no MI, no mitral valve prolapse, no pacemaker, no PE, no PVD, no  recent injury, no rheumatic fever, no seizures, no sickle cell disease, no sleep apnea, no spontaneous pneumothorax, no stimulant use, no strokes, no thyroid problem, no TIA, Shin syndrome and no valve disorder.   Hypertension   This is a chronic problem. The current episode started more than 1 year ago. The problem has been waxing and waning since onset. The problem is uncontrolled. Associated symptoms include chest pain, headaches, neck pain, palpitations and shortness of breath. Pertinent negatives include no anxiety, malaise/fatigue, orthopnea, peripheral edema, PND or sweats. Risk factors for coronary artery disease include male gender, sedentary lifestyle, smoking/tobacco exposure and dyslipidemia. Past treatments include ACE inhibitors and diuretics. Current antihypertension treatment includes ACE inhibitors and diuretics. The current treatment provides no improvement. There are no compliance problems.  There is no history of angina, kidney disease, CAD/MI, CVA, heart failure, left ventricular hypertrophy, PVD or retinopathy. There is no history of chronic renal disease, coarctation of the aorta, hyperaldosteronism, hypercortisolism, hyperparathyroidism, a hypertension causing med, pheochromocytoma, renovascular disease, sleep apnea or a thyroid problem.   Back Pain   This is a chronic problem. The current episode started more than 1 year ago. The problem occurs constantly. The problem is unchanged. The pain is present in the lumbar spine and sacro-iliac. The quality of the pain is described as aching. Stiffness is present all day. Associated symptoms include chest pain, headaches, numbness and weakness. Pertinent negatives include no abdominal pain, bladder incontinence, bowel incontinence, dysuria, fever, leg pain, paresis, paresthesias, pelvic pain, perianal numbness, tingling or weight loss. The treatment provided no relief.   Alcohol Problem   This is a chronic problem. The current episode started more  than 1 year ago. The problem occurs constantly. The problem has been unchanged. Associated symptoms include arthralgias, chest pain, fatigue, headaches, joint swelling, myalgias, neck pain, numbness and weakness. Pertinent negatives include no abdominal pain, anorexia, change in bowel habit, chills, congestion, coughing, diaphoresis, fever, nausea, rash, sore throat, swollen glands, urinary symptoms, vertigo, visual change or vomiting. Nothing aggravates the symptoms. He has tried nothing for the symptoms. The treatment provided no relief.    COPD   This is a new problem. The current episode started more than 1 year ago. The problem occurs constantly. Associated symptoms include arthralgias, chest pain, fatigue, headaches and weakness. Pertinent negatives include no abdominal pain, anorexia, change in bowel habit, chills, congestion, coughing, diaphoresis, fever, joint swelling, myalgias, nausea, neck pain or numbness.        Review of Systems  Review of Systems   Constitutional: Positive for activity change and fatigue. Negative for appetite change, chills, diaphoresis and fever.   HENT: Negative for congestion, postnasal drip, rhinorrhea, sinus pressure, sinus pain, sneezing, sore throat, trouble swallowing and voice change.    Respiratory: Positive for shortness of breath. Negative for cough, choking, chest tightness, wheezing and stridor.    Cardiovascular: Negative for chest pain.   Gastrointestinal: Negative for diarrhea, nausea and vomiting.   Musculoskeletal: Positive for arthralgias and back pain.   Neurological: Positive for weakness. Negative for headaches.       Patient Active Problem List   Diagnosis   • Annual physical exam   • General medical examination   • Encounter for screening for diabetes mellitus   • Encounter for screening for cardiovascular disorders   • Encounter for screening for endocrine disorder   • Chronic obstructive pulmonary disease (CMS/[facility])   • Tobacco abuse counseling   • Tobacco  user   • Dyspnea   • Back pain   • Elevated blood pressure reading   • Alcohol abuse counseling and surveillance   • Alcohol abuse   • Tachycardia   • Chest pain   • Essential hypertension   • Thrombocytopenia (CMS/HCC)   • Hyperlipidemia     No past surgical history on file.  Social History     Socioeconomic History   • Marital status:      Spouse name: Not on file   • Number of children: Not on file   • Years of education: Not on file   • Highest education level: Not on file   Tobacco Use   • Smoking status: Current Every Day Smoker   • Smokeless tobacco: Never Used   Substance and Sexual Activity   • Alcohol use: Yes     Family History   Problem Relation Age of Onset   • Heart disease Mother    • Stroke Mother    • Stroke Father    • Heart disease Father    • Heart disease Brother      Lab on 04/08/2019   Component Date Value Ref Range Status   • WBC 04/08/2019 4.44  3.40 - 10.80 10*3/mm3 Final   • RBC 04/08/2019 4.44  4.14 - 5.80 10*6/mm3 Final   • Hemoglobin 04/08/2019 14.0  13.0 - 17.7 g/dL Final   • Hematocrit 04/08/2019 41.5  37.5 - 51.0 % Final   • MCV 04/08/2019 93.5  79.0 - 97.0 fL Final   • MCH 04/08/2019 31.5  26.6 - 33.0 pg Final   • MCHC 04/08/2019 33.7  31.5 - 35.7 g/dL Final   • RDW 04/08/2019 13.6  12.3 - 15.4 % Final   • RDW-SD 04/08/2019 46.5  37.0 - 54.0 fl Final   • MPV 04/08/2019 12.2* 6.0 - 12.0 fL Final   • Platelets 04/08/2019 119* 140 - 450 10*3/mm3 Final   • Neutrophil % 04/08/2019 61.7  42.7 - 76.0 % Final   • Lymphocyte % 04/08/2019 23.9  19.6 - 45.3 % Final   • Monocyte % 04/08/2019 9.9  5.0 - 12.0 % Final   • Eosinophil % 04/08/2019 2.9  0.3 - 6.2 % Final   • Basophil % 04/08/2019 1.4  0.0 - 1.5 % Final   • Immature Grans % 04/08/2019 0.2  0.0 - 0.5 % Final   • Neutrophils, Absolute 04/08/2019 2.74  1.40 - 7.00 10*3/mm3 Final   • Lymphocytes, Absolute 04/08/2019 1.06  0.70 - 3.10 10*3/mm3 Final   • Monocytes, Absolute 04/08/2019 0.44  0.10 - 0.90 10*3/mm3 Final   •  Eosinophils, Absolute 04/08/2019 0.13  0.00 - 0.40 10*3/mm3 Final   • Basophils, Absolute 04/08/2019 0.06  0.00 - 0.20 10*3/mm3 Final   • Immature Grans, Absolute 04/08/2019 0.01  0.00 - 0.05 10*3/mm3 Final   • nRBC 04/08/2019 0.0  0.0 - 0.0 /100 WBC Final   • Glucose 04/08/2019 71  65 - 99 mg/dL Final   • BUN 04/08/2019 10  8 - 23 mg/dL Final   • Creatinine 04/08/2019 0.74* 0.76 - 1.27 mg/dL Final   • Sodium 04/08/2019 133* 136 - 145 mmol/L Final   • Potassium 04/08/2019 4.2  3.5 - 5.2 mmol/L Final   • Chloride 04/08/2019 90* 98 - 107 mmol/L Final   • CO2 04/08/2019 23.3  22.0 - 29.0 mmol/L Final   • Calcium 04/08/2019 9.1  8.6 - 10.5 mg/dL Final   • Total Protein 04/08/2019 8.6* 6.0 - 8.5 g/dL Final   • Albumin 04/08/2019 4.80  3.50 - 5.20 g/dL Final   • ALT (SGPT) 04/08/2019 18  1 - 41 U/L Final   • AST (SGOT) 04/08/2019 40  1 - 40 U/L Final   • Alkaline Phosphatase 04/08/2019 63  39 - 117 U/L Final   • Total Bilirubin 04/08/2019 0.4  0.2 - 1.2 mg/dL Final   • eGFR Non African Amer 04/08/2019 107  >60 mL/min/1.73 Final   • Globulin 04/08/2019 3.8  gm/dL Final   • A/G Ratio 04/08/2019 1.3  g/dL Final   • BUN/Creatinine Ratio 04/08/2019 13.5  7.0 - 25.0 Final   • Anion Gap 04/08/2019 19.7  mmol/L Final   • Hemoglobin A1C 04/08/2019 4.78* 4.80 - 5.60 % Final   • Total Cholesterol 04/08/2019 165  0 - 200 mg/dL Final   • Triglycerides 04/08/2019 45  0 - 150 mg/dL Final   • HDL Cholesterol 04/08/2019 115* 40 - 60 mg/dL Final   • LDL Cholesterol  04/08/2019 41  0 - 100 mg/dL Final   • VLDL Cholesterol 04/08/2019 9  5 - 40 mg/dL Final   • LDL/HDL Ratio 04/08/2019 0.36   Final   • TSH 04/08/2019 1.250  0.270 - 4.200 mIU/mL Final   • Free T4 04/08/2019 1.28  0.93 - 1.70 ng/dL Final   • T3, Free 04/08/2019 3.69  2.00 - 4.40 pg/mL Final   • 25 Hydroxy, Vitamin D 04/08/2019 28.3* 30.0 - 100.0 ng/ml Final      No image results found.    @[email or web address]@    There is no immunization history on file for this patient.    The  "following portions of the patient's history were reviewed and updated as appropriate: allergies, current medications, past family history, past medical history, past social history, past surgical history and problem list.        Physical Exam  /78   Pulse 60   Temp 99 °F (37.2 °C)   Ht 165.1 cm (65\")   Wt 63.6 kg (140 lb 3.2 oz)   SpO2 90%   BMI 23.33 kg/m²     Physical Exam   Constitutional: He is oriented to person, place, and time. He appears well-developed and well-nourished.   HENT:   Head: Normocephalic and atraumatic.   Right Ear: External ear normal.   Eyes: Conjunctivae and EOM are normal. Pupils are equal, round, and reactive to light.   Neck: Normal range of motion. Neck supple.   Cardiovascular: Normal rate, regular rhythm and normal heart sounds.   No murmur heard.  Pulmonary/Chest: No respiratory distress.   Decreased breath sounds in all lung fields    Abdominal: Soft. Bowel sounds are normal. He exhibits no distension. There is no tenderness.   Musculoskeletal: He exhibits tenderness. He exhibits no edema or deformity.        Thoracic back: He exhibits decreased range of motion, tenderness, bony tenderness, pain and spasm.        Lumbar back: He exhibits decreased range of motion, tenderness, bony tenderness, pain and spasm.   Neurological: He is alert and oriented to person, place, and time. No cranial nerve deficit.   Skin: Skin is warm. No rash noted. He is not diaphoretic. No erythema. No pallor.   Psychiatric: He has a normal mood and affect. His behavior is normal.   Nursing note and vitals reviewed.      Assessment/Plan   Problems Addressed this Visit        Cardiovascular and Mediastinum    Essential hypertension    Hyperlipidemia       Respiratory    Chronic obstructive pulmonary disease (CMS/HCC)       Nervous and Auditory    Back pain       Hematopoietic and Hemostatic    Thrombocytopenia (CMS/HCC)       Other    Tobacco abuse counseling    Tobacco user    Alcohol abuse " counseling and surveillance    Alcohol abuse - Primary        -went over labwork   -counseled pt to quit smoking >5 minutes   -recommend colonoscopy screening  -HLP - lipitor 20 mg PO qhs   -counseled pt to cut [place] n on alcohol >5 minutes  -thrombocytopenia - likely due to liver disease -recommend  Hematology referral with Dr. Serrano   -alcohol abuse/fatty liver - recommend Gastroenterology referral  With RIGOBERTO Saini   -back pain - recommend x-ray of back  -COPD - referral to Pulmonology. Pt is on Qvar along with albuterol inhaler.  Will get last office note  -chest pain/abnormal EKG - pt referred to cardiology. Pt has appt Friday with Dr. Malhotra.    -HTN -lisinopril-HCTZ 20-12.5 mg daily.  X 2  Pills daily.  Will go up from 1 to 2 pills daily   -recommend aspirin 81 mg daily  -GERD - protonix 40 mg PO q daily. Possible need EGD and colonoscopy screenign         This document has been electronically signed by Toby Harrell MD on April 22, 2019 9:49 AM                    This document has been electronically signed by Toby Harrell MD on April 20, 2019 1:30 PM

## 2019-04-22 ENCOUNTER — TELEPHONE (OUTPATIENT)
Dept: FAMILY MEDICINE CLINIC | Facility: CLINIC | Age: 64
End: 2019-04-22

## 2019-04-22 ENCOUNTER — OFFICE VISIT (OUTPATIENT)
Dept: FAMILY MEDICINE CLINIC | Facility: CLINIC | Age: 64
End: 2019-04-22

## 2019-04-22 VITALS
TEMPERATURE: 99 F | SYSTOLIC BLOOD PRESSURE: 160 MMHG | BODY MASS INDEX: 23.36 KG/M2 | WEIGHT: 140.2 LBS | DIASTOLIC BLOOD PRESSURE: 78 MMHG | HEART RATE: 60 BPM | OXYGEN SATURATION: 90 % | HEIGHT: 65 IN

## 2019-04-22 DIAGNOSIS — J44.9 CHRONIC OBSTRUCTIVE PULMONARY DISEASE, UNSPECIFIED COPD TYPE (HCC): ICD-10-CM

## 2019-04-22 DIAGNOSIS — E78.5 HYPERLIPIDEMIA, UNSPECIFIED HYPERLIPIDEMIA TYPE: ICD-10-CM

## 2019-04-22 DIAGNOSIS — Z71.41 ALCOHOL ABUSE COUNSELING AND SURVEILLANCE: ICD-10-CM

## 2019-04-22 DIAGNOSIS — M54.9 BACK PAIN, UNSPECIFIED BACK LOCATION, UNSPECIFIED BACK PAIN LATERALITY, UNSPECIFIED CHRONICITY: ICD-10-CM

## 2019-04-22 DIAGNOSIS — F10.10 ALCOHOL ABUSE: ICD-10-CM

## 2019-04-22 DIAGNOSIS — Z72.0 TOBACCO USER: ICD-10-CM

## 2019-04-22 DIAGNOSIS — Z71.6 TOBACCO ABUSE COUNSELING: ICD-10-CM

## 2019-04-22 DIAGNOSIS — K76.0 FATTY LIVER: ICD-10-CM

## 2019-04-22 DIAGNOSIS — D69.6 THROMBOCYTOPENIA (HCC): Primary | ICD-10-CM

## 2019-04-22 DIAGNOSIS — I10 ESSENTIAL HYPERTENSION: ICD-10-CM

## 2019-04-22 PROCEDURE — 99214 OFFICE O/P EST MOD 30 MIN: CPT | Performed by: FAMILY MEDICINE

## 2019-04-22 RX ORDER — ATORVASTATIN CALCIUM 20 MG/1
20 TABLET, FILM COATED ORAL DAILY
Qty: 30 TABLET | Refills: 3 | Status: SHIPPED | OUTPATIENT
Start: 2019-04-22 | End: 2019-08-01 | Stop reason: SDUPTHER

## 2019-04-22 RX ORDER — LISINOPRIL AND HYDROCHLOROTHIAZIDE 20; 12.5 MG/1; MG/1
2 TABLET ORAL DAILY
Qty: 60 TABLET | Refills: 3 | Status: SHIPPED | OUTPATIENT
Start: 2019-04-22 | End: 2019-05-30

## 2019-04-22 RX ORDER — BECLOMETHASONE DIPROPIONATE HFA 80 UG/1
AEROSOL, METERED RESPIRATORY (INHALATION)
COMMUNITY
Start: 2019-04-08 | End: 2019-05-22

## 2019-04-22 NOTE — PATIENT INSTRUCTIONS
Go up on lisinopril 20-12.5 mg x 2 pills daily.    Bring blood pressure readings next visit     recheck in 1 month     Go to imaging center In [place]. Bring CDs of imaging done at [facility]. Such as CT of abdomen.

## 2019-04-25 ENCOUNTER — TELEPHONE (OUTPATIENT)
Dept: FAMILY MEDICINE CLINIC | Facility: CLINIC | Age: 64
End: 2019-04-25

## 2019-05-01 ENCOUNTER — TELEPHONE (OUTPATIENT)
Dept: ONCOLOGY | Facility: HOSPITAL | Age: 64
End: 2019-05-01

## 2019-05-01 NOTE — TELEPHONE ENCOUNTER
Patient contact stated she gave patient message earlier today to come for appointment at 1:45pm.  She stated he was coming to this appt at this time as far as she knew and had not heard otherwise.

## 2019-05-08 PROBLEM — R11.2 NAUSEA AND VOMITING: Status: ACTIVE | Noted: 2019-05-08

## 2019-05-08 PROBLEM — K52.9 ENTERITIS: Status: ACTIVE | Noted: 2019-05-08

## 2019-05-21 NOTE — PROGRESS NOTES
Subjective:  Khadar Dent is a 64 y.o. male who presents for       Patient Active Problem List   Diagnosis   • Annual physical exam   • General medical examination   • Encounter for screening for diabetes mellitus   • Encounter for screening for cardiovascular disorders   • Encounter for screening for endocrine disorder   • Chronic obstructive pulmonary disease (CMS/HCC)   • Tobacco abuse counseling   • Tobacco user   • Dyspnea   • Back pain   • Elevated blood pressure reading   • Alcohol abuse counseling and surveillance   • Alcohol abuse   • Tachycardia   • Chest pain   • Essential hypertension   • Thrombocytopenia (CMS/HCC)   • Hyperlipidemia   • Fatty liver   • Enteritis   • Nausea and vomiting   • COPD exacerbation (CMS/HCC)   • Acute on chronic respiratory failure with hypoxia (CMS/HCC)   • At high risk for falls   • Gait instability           Current Outpatient Medications:   •  albuterol (ACCUNEB) 1.25 MG/3ML nebulizer solution, Take 3 mL by nebulization Every 6 (Six) Hours As Needed for Wheezing., Disp: 1 vial, Rfl: 3  •  albuterol sulfate  (90 Base) MCG/ACT inhaler, Inhale 2 puffs Every 6 (Six) Hours As Needed for Wheezing., Disp: 1 inhaler, Rfl: 3  •  atorvastatin (LIPITOR) 20 MG tablet, Take 1 tablet by mouth Daily., Disp: 30 tablet, Rfl: 3  •  azithromycin (ZITHROMAX) 500 MG tablet, , Disp: , Rfl:   •  cefuroxime (CEFTIN) 250 MG tablet, , Disp: , Rfl:   •  cyclobenzaprine (FLEXERIL) 5 MG tablet, , Disp: , Rfl:   •  lisinopril-hydrochlorothiazide (PRINZIDE,ZESTORETIC) 20-12.5 MG per tablet, Take 2 tablets by mouth Daily., Disp: 60 tablet, Rfl: 3  •  mometasone-formoterol (DULERA) 200-5 MCG/ACT inhaler, Inhale 2 puffs 2 (Two) Times a Day., Disp: 1 inhaler, Rfl: 3  •  nicotine (NICODERM CQ) 21 MG/24HR patch, Place 1 patch on the skin as directed by provider Daily., Disp: 30 patch, Rfl: 3  •  pantoprazole (PROTONIX) 40 MG EC tablet, Take 40 mg by mouth Daily., Disp: , Rfl:   •  predniSONE  (DELTASONE) 10 MG tablet, Take 4 pills for 4 days 3 pills for 3 days 2 pills for 2 days and 1 pill, Disp: 30 tablet, Rfl: 0  •  sodium chloride 1 g tablet, Take 1 g by mouth Daily., Disp: , Rfl:     Current Facility-Administered Medications:   •  methylPREDNISolone sodium succinate (SOLU-Medrol) injection 125 mg, 125 mg, Intravenous, Q6H, Toby Harrell MD, 125 mg at 05/22/19 1020    PT is 65 yo male with history of multiple joitn pain, overweight,  Fatty liver, alcohol abuse, tobacco use, Hypertension, COPD, GERD coming in for recheck.  I have recent Discharge records from [facility]. PT was admitted on 3/10/19 and discharged on 3/12/19 for nausea vomiting/severe dehydration/acute renal failure. Pt got better. He was set up with Pulmonology and Cardiology referral.  Pt recently had labwork. That showed low vitamin D, thyroid studies normal . LDL is at 41 with HDL at 115. CMP showed normal renal and liver function. CBC showed thrombocytopenia at 119. With normal hemoglobin.  On discharge summary CT of abdomen with contrast showed mild fatty liver disease. He continues to drink alcohol and a 6 pack of beer would last him for a day and half.  He has been drinking >40 years.    He recently saw Dr. Spain and was started on QVAR along with nebulizer. He is down to 5 cigarettes a day. He has been using the nicotine patch.      He was recently admitted to [facility] on 4/24/19 and discharged on 4/26/19 for enteritis, dehydration. COPD exacerbation, dysphagia. He initially presented to ER with abdominal pain/ wheezing and shortness of air. He had CT of abdomen that showed thickening of proximal small bowel suggesting enteririts. He was given IV fluid and antibiotics such as zoysn and flagyl. He also had dysphagia and EGD was performed that improved his symptoms. He was given PO antibiotics levaquin and flagyl for 5 days.  Pt continues to drink alcohol and has cut down on smoking to 1/2 ppd. He has problems with breathing as of  now. He has appt with Pulmonologist soon. His oxygen today is at 89%.  Pt also has chronic back pain and gait instability. He is requesting a motorized  wheelchair.  He has trouble with ambulating and gait           Abdominal Pain   This is a chronic problem. The current episode started more than 1 year ago. The onset quality is gradual. The problem has been gradually worsening. The pain is at a severity of 4/10. The pain is moderate. The quality of the pain is aching. The abdominal pain does not radiate. Associated symptoms include headaches. Pertinent negatives include no anorexia, arthralgias, belching, constipation, diarrhea, dysuria, fever, flatus, hematochezia, hematuria, melena, myalgias, nausea, vomiting or weight loss. The pain is aggravated by bowel movement and deep breathing. The pain is relieved by nothing. He has tried nothing for the symptoms. Prior diagnostic workup includes CT scan and upper endoscopy. His past medical history is significant for GERD. There is no history of abdominal surgery, colon cancer, Crohn's disease, gallstones, pancreatitis, PUD or ulcerative colitis.    Chest Pain    The current episode started more than 1 year ago. The onset quality is gradual. The problem occurs intermittently. The problem has been waxing and waning. The pain is present in the substernal region and lateral region. The pain is at a severity of 5/10. The pain is moderate. The quality of the pain is described as numbness, pressure and tightness. Associated symptoms include back pain, dizziness, exertional chest pressure, headaches, numbness, palpitations, shortness of breath and weakness. Pertinent negatives include no abdominal pain, claudication, cough, diaphoresis, fever, hemoptysis, irregular heartbeat, leg pain, lower extremity edema, malaise/fatigue, nausea, near-syncope, orthopnea, PND, sputum production, syncope or vomiting. The pain is aggravated by breathing, coughing, deep breathing, movement,  walking and exertion. The treatment provided no relief. Risk factors include alcohol intake, being elderly, lack of exercise, smoking/tobacco exposure, sedentary lifestyle and stress.   His past medical history is significant for hypertension.   Pertinent negatives for past medical history include no aneurysm, no anxiety/panic attacks, no aortic aneurysm, no aortic dissection, no arrhythmia, no bicuspid aortic valve, no CAD, no cancer, no congenital heart disease, no connective tissue disease, no COPD, no CHF, no diabetes, no DVT, no hyperhomocysteinemia, no hyperlipidemia, no Kawasaki disease, no Marfan's syndrome, no MI, no mitral valve prolapse, no pacemaker, no PE, no PVD, no recent injury, no rheumatic fever, no seizures, no sickle cell disease, no sleep apnea, no spontaneous pneumothorax, no stimulant use, no strokes, no thyroid problem, no TIA, Shin syndrome and no valve disorder.   Hypertension   This is a chronic problem. The current episode started more than 1 year ago. The problem has been waxing and waning since onset. The problem is uncontrolled. Associated symptoms include chest pain, headaches, neck pain, palpitations and shortness of breath. Pertinent negatives include no anxiety, malaise/fatigue, orthopnea, peripheral edema, PND or sweats. Risk factors for coronary artery disease include male gender, sedentary lifestyle, smoking/tobacco exposure and dyslipidemia. Past treatments include ACE inhibitors and diuretics. Current antihypertension treatment includes ACE inhibitors and diuretics. The current treatment provides no improvement. There are no compliance problems.  There is no history of angina, kidney disease, CAD/MI, CVA, heart failure, left ventricular hypertrophy, PVD or retinopathy. There is no history of chronic renal disease, coarctation of the aorta, hyperaldosteronism, hypercortisolism, hyperparathyroidism, a hypertension causing med, pheochromocytoma, renovascular disease, sleep  apnea or a thyroid problem.   Back Pain   This is a chronic problem. The current episode started more than 1 year ago. The problem occurs constantly. The problem is unchanged. The pain is present in the lumbar spine and sacro-iliac. The quality of the pain is described as aching. Stiffness is present all day. Associated symptoms include chest pain, headaches, numbness and weakness. Pertinent negatives include no abdominal pain, bladder incontinence, bowel incontinence, dysuria, fever, leg pain, paresis, paresthesias, pelvic pain, perianal numbness, tingling or weight loss. The treatment provided no relief.   Alcohol Problem   This is a chronic problem. The current episode started more than 1 year ago. The problem occurs constantly. The problem has been unchanged. Associated symptoms include arthralgias, chest pain, fatigue, headaches, joint swelling, myalgias, neck pain, numbness and weakness. Pertinent negatives include no abdominal pain, anorexia, change in bowel habit, chills, congestion, coughing, diaphoresis, fever, nausea, rash, sore throat, swollen glands, urinary symptoms, vertigo, visual change or vomiting. Nothing aggravates the symptoms. He has tried nothing for the symptoms. The treatment provided no relief.    COPD   This is a new problem. The current episode started more than 1 year ago. The problem occurs constantly. Associated symptoms include arthralgias, chest pain, fatigue, headaches and weakness. Pertinent negatives include no abdominal pain, anorexia, change in bowel habit, chills, congestion, coughing, diaphoresis, fever, joint swelling, myalgias, nausea, neck pain or numbness.        Review of Systems  Review of Systems   Constitutional: Negative for activity change, appetite change, chills, diaphoresis, fatigue, fever and weight loss.   HENT: Negative for congestion, postnasal drip, rhinorrhea, sinus pressure, sinus pain, sneezing, sore throat, trouble swallowing and voice change.    Eyes:  Negative for discharge.   Respiratory: Positive for chest tightness and shortness of breath. Negative for cough, choking, wheezing and stridor.    Cardiovascular: Negative for chest pain.   Gastrointestinal: Positive for abdominal pain. Negative for anorexia, constipation, diarrhea, flatus, hematochezia, melena, nausea and vomiting.   Genitourinary: Negative for dysuria and hematuria.   Musculoskeletal: Negative for arthralgias and myalgias.   Neurological: Positive for dizziness, weakness and headaches.   Psychiatric/Behavioral: Positive for agitation.       Patient Active Problem List   Diagnosis   • Annual physical exam   • General medical examination   • Encounter for screening for diabetes mellitus   • Encounter for screening for cardiovascular disorders   • Encounter for screening for endocrine disorder   • Chronic obstructive pulmonary disease (CMS/HCC)   • Tobacco abuse counseling   • Tobacco user   • Dyspnea   • Back pain   • Elevated blood pressure reading   • Alcohol abuse counseling and surveillance   • Alcohol abuse   • Tachycardia   • Chest pain   • Essential hypertension   • Thrombocytopenia (CMS/HCC)   • Hyperlipidemia   • Fatty liver   • Enteritis   • Nausea and vomiting   • COPD exacerbation (CMS/HCC)   • Acute on chronic respiratory failure with hypoxia (CMS/HCC)   • At high risk for falls   • Gait instability     No past surgical history on file.  Social History     Socioeconomic History   • Marital status:      Spouse name: Not on file   • Number of children: Not on file   • Years of education: Not on file   • Highest education level: Not on file   Tobacco Use   • Smoking status: Current Every Day Smoker   • Smokeless tobacco: Never Used   Substance and Sexual Activity   • Alcohol use: Yes     Family History   Problem Relation Age of Onset   • Heart disease Mother    • Stroke Mother    • Stroke Father    • Heart disease Father    • Heart disease Brother      Lab on 04/08/2019   Component  Date Value Ref Range Status   • WBC 04/08/2019 4.44  3.40 - 10.80 10*3/mm3 Final   • RBC 04/08/2019 4.44  4.14 - 5.80 10*6/mm3 Final   • Hemoglobin 04/08/2019 14.0  13.0 - 17.7 g/dL Final   • Hematocrit 04/08/2019 41.5  37.5 - 51.0 % Final   • MCV 04/08/2019 93.5  79.0 - 97.0 fL Final   • MCH 04/08/2019 31.5  26.6 - 33.0 pg Final   • MCHC 04/08/2019 33.7  31.5 - 35.7 g/dL Final   • RDW 04/08/2019 13.6  12.3 - 15.4 % Final   • RDW-SD 04/08/2019 46.5  37.0 - 54.0 fl Final   • MPV 04/08/2019 12.2* 6.0 - 12.0 fL Final   • Platelets 04/08/2019 119* 140 - 450 10*3/mm3 Final   • Neutrophil % 04/08/2019 61.7  42.7 - 76.0 % Final   • Lymphocyte % 04/08/2019 23.9  19.6 - 45.3 % Final   • Monocyte % 04/08/2019 9.9  5.0 - 12.0 % Final   • Eosinophil % 04/08/2019 2.9  0.3 - 6.2 % Final   • Basophil % 04/08/2019 1.4  0.0 - 1.5 % Final   • Immature Grans % 04/08/2019 0.2  0.0 - 0.5 % Final   • Neutrophils, Absolute 04/08/2019 2.74  1.40 - 7.00 10*3/mm3 Final   • Lymphocytes, Absolute 04/08/2019 1.06  0.70 - 3.10 10*3/mm3 Final   • Monocytes, Absolute 04/08/2019 0.44  0.10 - 0.90 10*3/mm3 Final   • Eosinophils, Absolute 04/08/2019 0.13  0.00 - 0.40 10*3/mm3 Final   • Basophils, Absolute 04/08/2019 0.06  0.00 - 0.20 10*3/mm3 Final   • Immature Grans, Absolute 04/08/2019 0.01  0.00 - 0.05 10*3/mm3 Final   • nRBC 04/08/2019 0.0  0.0 - 0.0 /100 WBC Final   • Glucose 04/08/2019 71  65 - 99 mg/dL Final   • BUN 04/08/2019 10  8 - 23 mg/dL Final   • Creatinine 04/08/2019 0.74* 0.76 - 1.27 mg/dL Final   • Sodium 04/08/2019 133* 136 - 145 mmol/L Final   • Potassium 04/08/2019 4.2  3.5 - 5.2 mmol/L Final   • Chloride 04/08/2019 90* 98 - 107 mmol/L Final   • CO2 04/08/2019 23.3  22.0 - 29.0 mmol/L Final   • Calcium 04/08/2019 9.1  8.6 - 10.5 mg/dL Final   • Total Protein 04/08/2019 8.6* 6.0 - 8.5 g/dL Final   • Albumin 04/08/2019 4.80  3.50 - 5.20 g/dL Final   • ALT (SGPT) 04/08/2019 18  1 - 41 U/L Final   • AST (SGOT) 04/08/2019 40  1 - 40  "U/L Final   • Alkaline Phosphatase 04/08/2019 63  39 - 117 U/L Final   • Total Bilirubin 04/08/2019 0.4  0.2 - 1.2 mg/dL Final   • eGFR Non African Amer 04/08/2019 107  >60 mL/min/1.73 Final   • Globulin 04/08/2019 3.8  gm/dL Final   • A/G Ratio 04/08/2019 1.3  g/dL Final   • BUN/Creatinine Ratio 04/08/2019 13.5  7.0 - 25.0 Final   • Anion Gap 04/08/2019 19.7  mmol/L Final   • Hemoglobin A1C 04/08/2019 4.78* 4.80 - 5.60 % Final   • Total Cholesterol 04/08/2019 165  0 - 200 mg/dL Final   • Triglycerides 04/08/2019 45  0 - 150 mg/dL Final   • HDL Cholesterol 04/08/2019 115* 40 - 60 mg/dL Final   • LDL Cholesterol  04/08/2019 41  0 - 100 mg/dL Final   • VLDL Cholesterol 04/08/2019 9  5 - 40 mg/dL Final   • LDL/HDL Ratio 04/08/2019 0.36   Final   • TSH 04/08/2019 1.250  0.270 - 4.200 mIU/mL Final   • Free T4 04/08/2019 1.28  0.93 - 1.70 ng/dL Final   • T3, Free 04/08/2019 3.69  2.00 - 4.40 pg/mL Final   • 25 Hydroxy, Vitamin D 04/08/2019 28.3* 30.0 - 100.0 ng/ml Final      No image results found.    [facility]    There is no immunization history on file for this patient.    The following portions of the patient's history were reviewed and updated as appropriate: allergies, current medications, past family history, past medical history, past social history, past surgical history and problem list.        Physical Exam  /80   Pulse 98   Temp 98.6 °F (37 °C)   Ht 165.1 cm (65\")   Wt 62.7 kg (138 lb 3.2 oz)   SpO2 (!) 88%   BMI 23.00 kg/m²     Physical Exam   Constitutional: He is oriented to person, place, and time. He appears well-developed and well-nourished.   HENT:   Head: Normocephalic and atraumatic.   Right Ear: External ear normal.   Eyes: Conjunctivae and EOM are normal. Pupils are equal, round, and reactive to light.   Neck: Normal range of motion. Neck supple.   Cardiovascular: Normal rate, regular rhythm and normal heart sounds.   No murmur heard.  Pulmonary/Chest: Effort normal and breath sounds " normal. No respiratory distress.   Decreased breath sounds in all lung fields    Abdominal: Soft. Bowel sounds are normal. He exhibits no distension. There is no tenderness.   Musculoskeletal: Normal range of motion. He exhibits no edema or deformity.   Neurological: He is alert and oriented to person, place, and time. No cranial nerve deficit.   Skin: Skin is warm. No rash noted. He is not diaphoretic. No erythema. No pallor.   Psychiatric: He has a normal mood and affect. His behavior is normal.   Nursing note and vitals reviewed.      Assessment/Plan   Problems Addressed this Visit        Cardiovascular and Mediastinum    Essential hypertension    Hyperlipidemia       Respiratory    Chronic obstructive pulmonary disease (CMS/HCC)    Relevant Medications    mometasone-formoterol (DULERA) 200-5 MCG/ACT inhaler    predniSONE (DELTASONE) 10 MG tablet    COPD exacerbation (CMS/HCC) - Primary    Relevant Medications    mometasone-formoterol (DULERA) 200-5 MCG/ACT inhaler    predniSONE (DELTASONE) 10 MG tablet    methylPREDNISolone sodium succinate (SOLU-Medrol) injection 125 mg (Start on 5/22/2019 11:15 AM)    Acute on chronic respiratory failure with hypoxia (CMS/HCC)       Digestive    Fatty liver    Enteritis    Nausea and vomiting       Nervous and Auditory    Back pain       Hematopoietic and Hemostatic    Thrombocytopenia (CMS/HCC)       Other    Tobacco abuse counseling    Tobacco user    Alcohol abuse counseling and surveillance    Alcohol abuse    At high risk for falls    Gait instability        -reviewed last [facility] discharge summary  -enteririts -pt finished PO abx.    -counseled pt to quit smoking >5 minutes   -gait instability/high fall risk - gave prescription for motorized wheelchair. He would benefit with wheelchair since pt is a high fall risk.    -recommend colonoscopy screening  -HLP - lipitor 20 mg PO qhs   -counseled pt to cut [place] n on alcohol >5 minutes  -thrombocytopenia - likely due to liver  "disease -recommend  Hematology referral with Dr. Serrano . He missed his last appt.   -alcohol abuse/fatty liver - recommend Gastroenterology referral  With RIGOBERTO Saini   -back pain - recommend x-ray of back  -COPD/COPD exacerbation/hypoxic respiratory failure.  - referred  to Pulmonology. Pt is on Qvar along with albuterol inhaler.  he is on nbeulizer.  Pt's oxygen low at 89%.  Will see if pt can see Pulmonology sooner for possible oxygen.  Will stop QVAR and gave samples today of Dulera 200/5mcg 2 puffs BID sent prescription to pharmacy.  Also will give solumedrol 120 mcg IM x 1. Today. Prednisone tapering dose . I highly recommend  Pt go to hospital for treatment of his COPD. He declined to go to hospital at this time. He states \"I will go to the ER if I am feeling worse.\" He will sign AMA form.  His oxygen did go down to 75%.  He does have an appt with Pulmonology soon.    -chest pain/abnormal EKG - pt referred to cardiology. Pt has appt Friday with Dr. Malhotra.    -HTN -lisinopril-HCTZ 20-12.5 mg daily.  X 2  Pills daily.  Will go up from 1 to 2 pills daily   -recommend aspirin 81 mg daily  -GERD - protonix 40 mg PO q daily. Possible need EGD and colonoscopy screening  -recheck in 1 week   -since pt signed AMA today. Advised to be safe and proceed to ER or call 911 if symptoms severe         This document has been electronically signed by Toby Harrell MD on May 22, 2019 10:24 AM                      This document has been electronically signed by Toby Harrell MD on May 22, 2019 10:24 AM     "

## 2019-05-22 ENCOUNTER — OFFICE VISIT (OUTPATIENT)
Dept: FAMILY MEDICINE CLINIC | Facility: CLINIC | Age: 64
End: 2019-05-22

## 2019-05-22 VITALS
SYSTOLIC BLOOD PRESSURE: 140 MMHG | BODY MASS INDEX: 23.03 KG/M2 | DIASTOLIC BLOOD PRESSURE: 80 MMHG | HEART RATE: 98 BPM | OXYGEN SATURATION: 88 % | HEIGHT: 65 IN | TEMPERATURE: 98.6 F | WEIGHT: 138.2 LBS

## 2019-05-22 DIAGNOSIS — J44.9 CHRONIC OBSTRUCTIVE PULMONARY DISEASE, UNSPECIFIED COPD TYPE (HCC): ICD-10-CM

## 2019-05-22 DIAGNOSIS — Z71.41 ALCOHOL ABUSE COUNSELING AND SURVEILLANCE: ICD-10-CM

## 2019-05-22 DIAGNOSIS — K76.0 FATTY LIVER: ICD-10-CM

## 2019-05-22 DIAGNOSIS — R11.2 NAUSEA AND VOMITING, INTRACTABILITY OF VOMITING NOT SPECIFIED, UNSPECIFIED VOMITING TYPE: ICD-10-CM

## 2019-05-22 DIAGNOSIS — Z72.0 TOBACCO USER: ICD-10-CM

## 2019-05-22 DIAGNOSIS — Z91.81 AT HIGH RISK FOR FALLS: ICD-10-CM

## 2019-05-22 DIAGNOSIS — I10 ESSENTIAL HYPERTENSION: ICD-10-CM

## 2019-05-22 DIAGNOSIS — D69.6 THROMBOCYTOPENIA (HCC): ICD-10-CM

## 2019-05-22 DIAGNOSIS — J44.1 COPD EXACERBATION (HCC): Primary | ICD-10-CM

## 2019-05-22 DIAGNOSIS — R26.81 GAIT INSTABILITY: ICD-10-CM

## 2019-05-22 DIAGNOSIS — K52.9 ENTERITIS: ICD-10-CM

## 2019-05-22 DIAGNOSIS — J96.21 ACUTE ON CHRONIC RESPIRATORY FAILURE WITH HYPOXIA (HCC): ICD-10-CM

## 2019-05-22 DIAGNOSIS — Z71.6 TOBACCO ABUSE COUNSELING: ICD-10-CM

## 2019-05-22 DIAGNOSIS — E78.5 HYPERLIPIDEMIA, UNSPECIFIED HYPERLIPIDEMIA TYPE: ICD-10-CM

## 2019-05-22 DIAGNOSIS — F10.10 ALCOHOL ABUSE: ICD-10-CM

## 2019-05-22 DIAGNOSIS — M54.9 BACK PAIN, UNSPECIFIED BACK LOCATION, UNSPECIFIED BACK PAIN LATERALITY, UNSPECIFIED CHRONICITY: ICD-10-CM

## 2019-05-22 PROCEDURE — 99214 OFFICE O/P EST MOD 30 MIN: CPT | Performed by: FAMILY MEDICINE

## 2019-05-22 PROCEDURE — 96372 THER/PROPH/DIAG INJ SC/IM: CPT | Performed by: FAMILY MEDICINE

## 2019-05-22 RX ORDER — METHYLPREDNISOLONE SODIUM SUCCINATE 125 MG/2ML
125 INJECTION, POWDER, LYOPHILIZED, FOR SOLUTION INTRAMUSCULAR; INTRAVENOUS EVERY 6 HOURS
Status: DISCONTINUED | OUTPATIENT
Start: 2019-05-22 | End: 2019-12-17

## 2019-05-22 RX ORDER — PREDNISONE 10 MG/1
TABLET ORAL
Qty: 30 TABLET | Refills: 0 | Status: SHIPPED | OUTPATIENT
Start: 2019-05-22 | End: 2019-06-19

## 2019-05-22 RX ORDER — CEFUROXIME AXETIL 250 MG/1
TABLET ORAL
COMMUNITY
Start: 2019-05-20 | End: 2019-06-19

## 2019-05-22 RX ORDER — CYCLOBENZAPRINE HCL 5 MG
TABLET ORAL
COMMUNITY
Start: 2019-04-29 | End: 2019-06-19 | Stop reason: SDUPTHER

## 2019-05-22 RX ORDER — AZITHROMYCIN 500 MG/1
TABLET, FILM COATED ORAL
COMMUNITY
Start: 2019-05-20 | End: 2019-06-19

## 2019-05-22 RX ADMIN — METHYLPREDNISOLONE SODIUM SUCCINATE 125 MG: 125 INJECTION, POWDER, LYOPHILIZED, FOR SOLUTION INTRAMUSCULAR; INTRAVENOUS at 10:20

## 2019-05-29 NOTE — PROGRESS NOTES
Subjective:  Khadar Dent is a 64 y.o. male who presents for       Patient Active Problem List   Diagnosis   • Annual physical exam   • General medical examination   • Encounter for screening for diabetes mellitus   • Encounter for screening for cardiovascular disorders   • Encounter for screening for endocrine disorder   • Chronic obstructive pulmonary disease (CMS/HCC)   • Tobacco abuse counseling   • Tobacco user   • Dyspnea   • Back pain   • Elevated blood pressure reading   • Alcohol abuse counseling and surveillance   • Alcohol abuse   • Tachycardia   • Chest pain   • Essential hypertension   • Thrombocytopenia (CMS/HCC)   • Hyperlipidemia   • Fatty liver   • Enteritis   • Nausea and vomiting   • COPD exacerbation (CMS/HCC)   • Acute on chronic respiratory failure with hypoxia (CMS/HCC)   • At high risk for falls   • Gait instability           Current Outpatient Medications:   •  albuterol (ACCUNEB) 1.25 MG/3ML nebulizer solution, Take 3 mL by nebulization Every 6 (Six) Hours As Needed for Wheezing., Disp: 1 vial, Rfl: 3  •  albuterol sulfate  (90 Base) MCG/ACT inhaler, Inhale 2 puffs Every 6 (Six) Hours As Needed for Wheezing., Disp: 1 inhaler, Rfl: 3  •  atorvastatin (LIPITOR) 20 MG tablet, Take 1 tablet by mouth Daily., Disp: 30 tablet, Rfl: 3  •  azithromycin (ZITHROMAX) 500 MG tablet, , Disp: , Rfl:   •  cefuroxime (CEFTIN) 250 MG tablet, , Disp: , Rfl:   •  cyclobenzaprine (FLEXERIL) 5 MG tablet, , Disp: , Rfl:   •  lisinopril (PRINIVIL,ZESTRIL) 40 MG tablet, Take 1 tablet by mouth Daily., Disp: 30 tablet, Rfl: 3  •  mometasone-formoterol (DULERA) 200-5 MCG/ACT inhaler, Inhale 2 puffs 2 (Two) Times a Day., Disp: 1 inhaler, Rfl: 3  •  mometasone-formoterol (DULERA) 200-5 MCG/ACT inhaler, Inhale 2 puffs 2 (Two) Times a Day., Disp: 1 inhaler, Rfl: 0  •  nicotine (NICODERM CQ) 21 MG/24HR patch, Place 1 patch on the skin as directed by provider Daily., Disp: 30 patch, Rfl: 3  •  pantoprazole  (PROTONIX) 40 MG EC tablet, Take 40 mg by mouth Daily., Disp: , Rfl:   •  predniSONE (DELTASONE) 10 MG tablet, Take 4 pills for 4 days 3 pills for 3 days 2 pills for 2 days and 1 pill, Disp: 30 tablet, Rfl: 0  •  sodium chloride 1 g tablet, Take 1 g by mouth Daily., Disp: , Rfl:     Current Facility-Administered Medications:   •  methylPREDNISolone sodium succinate (SOLU-Medrol) injection 125 mg, 125 mg, Intravenous, Q6H, Toby Harrell MD, 125 mg at 05/22/19 1020    HPI        5/22/19 PT is 65 yo male with history of multiple joitn pain, overweight,  Fatty liver, alcohol abuse, tobacco use, Hypertension, COPD, GERD coming in for recheck.  I have recent Discharge records from [facility]. PT was admitted on 3/10/19 and discharged on 3/12/19 for nausea vomiting/severe dehydration/acute renal failure. Pt got better. He was set up with Pulmonology and Cardiology referral.  Pt recently had labwork. That showed low vitamin D, thyroid studies normal . LDL is at 41 with HDL at 115. CMP showed normal renal and liver function. CBC showed thrombocytopenia at 119. With normal hemoglobin.  On discharge summary CT of abdomen with contrast showed mild fatty liver disease. He continues to drink alcohol and a 6 pack of beer would last him for a day and half.  He has been drinking >40 years.    He recently saw Dr. Spain and was started on QVAR along with nebulizer. He is down to 5 cigarettes a day. He has been using the nicotine patch.  He was recently admitted to [facility] on 4/24/19 and discharged on 4/26/19 for enteritis, dehydration. COPD exacerbation, dysphagia. He initially presented to ER with abdominal pain/ wheezing and shortness of air. He had CT of abdomen that showed thickening of proximal small bowel suggesting enteririts. He was given IV fluid and antibiotics such as zoysn and flagyl. He also had dysphagia and EGD was performed that improved his symptoms. He was given PO antibiotics levaquin and flagyl for 5 days.  Pt  continues to drink alcohol and has cut down on smoking to 1/2 ppd. He has problems with breathing as of now. He has appt with Pulmonologist soon. His oxygen today is at 89%.  Pt also has chronic back pain and gait instability. He is requesting a motorized  wheelchair.  He has trouble with ambulating and gait      5/29/19 pt is here for recheck on COPD exacerbation. On last visit he was given solumedrol 125 mg IM injection along with prednisone tapering dose. He was also given dulera to help with breathing. He was recommended to go to [facility] ER but pt refused and signed AMA.  He was also set up with appt with Pulmonologist and has appt with PUlmonologist soon.  His breathing is somewhat better. He continues to smoke and is taking his prednisone tapering dose along with dulera 200/5 mg e            Abdominal Pain   This is a chronic problem. The current episode started more than 1 year ago. The onset quality is gradual. The problem has been gradually worsening. The pain is at a severity of 4/10. The pain is moderate. The quality of the pain is aching. The abdominal pain does not radiate. Associated symptoms include headaches. Pertinent negatives include no anorexia, arthralgias, belching, constipation, diarrhea, dysuria, fever, flatus, hematochezia, hematuria, melena, myalgias, nausea, vomiting or weight loss. The pain is aggravated by bowel movement and deep breathing. The pain is relieved by nothing. He has tried nothing for the symptoms. Prior diagnostic workup includes CT scan and upper endoscopy. His past medical history is significant for GERD. There is no history of abdominal surgery, colon cancer, Crohn's disease, gallstones, pancreatitis, PUD or ulcerative colitis.    Chest Pain    The current episode started more than 1 year ago. The onset quality is gradual. The problem occurs intermittently. The problem has been waxing and waning. The pain is present in the substernal region and lateral region. The pain is  at a severity of 5/10. The pain is moderate. The quality of the pain is described as numbness, pressure and tightness. Associated symptoms include back pain, dizziness, exertional chest pressure, headaches, numbness, palpitations, shortness of breath and weakness. Pertinent negatives include no abdominal pain, claudication, cough, diaphoresis, fever, hemoptysis, irregular heartbeat, leg pain, lower extremity edema, malaise/fatigue, nausea, near-syncope, orthopnea, PND, sputum production, syncope or vomiting. The pain is aggravated by breathing, coughing, deep breathing, movement, walking and exertion. The treatment provided no relief. Risk factors include alcohol intake, being elderly, lack of exercise, smoking/tobacco exposure, sedentary lifestyle and stress.   His past medical history is significant for hypertension.   Pertinent negatives for past medical history include no aneurysm, no anxiety/panic attacks, no aortic aneurysm, no aortic dissection, no arrhythmia, no bicuspid aortic valve, no CAD, no cancer, no congenital heart disease, no connective tissue disease, no COPD, no CHF, no diabetes, no DVT, no hyperhomocysteinemia, no hyperlipidemia, no Kawasaki disease, no Marfan's syndrome, no MI, no mitral valve prolapse, no pacemaker, no PE, no PVD, no recent injury, no rheumatic fever, no seizures, no sickle cell disease, no sleep apnea, no spontaneous pneumothorax, no stimulant use, no strokes, no thyroid problem, no TIA, Shin syndrome and no valve disorder.   Hypertension   This is a chronic problem. The current episode started more than 1 year ago. The problem has been waxing and waning since onset. The problem is uncontrolled. Associated symptoms include chest pain, headaches, neck pain, palpitations and shortness of breath. Pertinent negatives include no anxiety, malaise/fatigue, orthopnea, peripheral edema, PND or sweats. Risk factors for coronary artery disease include male gender, sedentary lifestyle,  smoking/tobacco exposure and dyslipidemia. Past treatments include ACE inhibitors and diuretics. Current antihypertension treatment includes ACE inhibitors and diuretics. The current treatment provides no improvement. There are no compliance problems.  There is no history of angina, kidney disease, CAD/MI, CVA, heart failure, left ventricular hypertrophy, PVD or retinopathy. There is no history of chronic renal disease, coarctation of the aorta, hyperaldosteronism, hypercortisolism, hyperparathyroidism, a hypertension causing med, pheochromocytoma, renovascular disease, sleep apnea or a thyroid problem.   Back Pain   This is a chronic problem. The current episode started more than 1 year ago. The problem occurs constantly. The problem is unchanged. The pain is present in the lumbar spine and sacro-iliac. The quality of the pain is described as aching. Stiffness is present all day. Associated symptoms include chest pain, headaches, numbness and weakness. Pertinent negatives include no abdominal pain, bladder incontinence, bowel incontinence, dysuria, fever, leg pain, paresis, paresthesias, pelvic pain, perianal numbness, tingling or weight loss. The treatment provided no relief.   Alcohol Problem   This is a chronic problem. The current episode started more than 1 year ago. The problem occurs constantly. The problem has been unchanged. Associated symptoms include arthralgias, chest pain, fatigue, headaches, joint swelling, myalgias, neck pain, numbness and weakness. Pertinent negatives include no abdominal pain, anorexia, change in bowel habit, chills, congestion, coughing, diaphoresis, fever, nausea, rash, sore throat, swollen glands, urinary symptoms, vertigo, visual change or vomiting. Nothing aggravates the symptoms. He has tried nothing for the symptoms. The treatment provided no relief.    COPD   This is a new problem. The current episode started more than 1 year ago. The problem occurs constantly. Associated  symptoms include arthralgias, chest pain, fatigue, headaches and weakness. Pertinent negatives include no abdominal pain, anorexia, change in bowel habit, chills, congestion, coughing, diaphoresis, fever, joint swelling, myalgias, nausea, neck pain or numbness.     Review of Systems  Review of Systems   Constitutional: Positive for activity change and fatigue. Negative for appetite change, chills, diaphoresis and fever.   HENT: Negative for congestion, postnasal drip, rhinorrhea, sinus pressure, sinus pain, sneezing, sore throat, trouble swallowing and voice change.    Respiratory: Positive for chest tightness and shortness of breath. Negative for cough, choking, wheezing and stridor.    Cardiovascular: Negative for chest pain.   Gastrointestinal: Negative for diarrhea, nausea and vomiting.   Musculoskeletal: Positive for arthralgias, back pain and gait problem.   Neurological: Positive for weakness. Negative for headaches.       Patient Active Problem List   Diagnosis   • Annual physical exam   • General medical examination   • Encounter for screening for diabetes mellitus   • Encounter for screening for cardiovascular disorders   • Encounter for screening for endocrine disorder   • Chronic obstructive pulmonary disease (CMS/HCC)   • Tobacco abuse counseling   • Tobacco user   • Dyspnea   • Back pain   • Elevated blood pressure reading   • Alcohol abuse counseling and surveillance   • Alcohol abuse   • Tachycardia   • Chest pain   • Essential hypertension   • Thrombocytopenia (CMS/HCC)   • Hyperlipidemia   • Fatty liver   • Enteritis   • Nausea and vomiting   • COPD exacerbation (CMS/HCC)   • Acute on chronic respiratory failure with hypoxia (CMS/HCC)   • At high risk for falls   • Gait instability     No past surgical history on file.  Social History     Socioeconomic History   • Marital status:      Spouse name: Not on file   • Number of children: Not on file   • Years of education: Not on file   •  Highest education level: Not on file   Tobacco Use   • Smoking status: Current Every Day Smoker   • Smokeless tobacco: Never Used   Substance and Sexual Activity   • Alcohol use: Yes     Family History   Problem Relation Age of Onset   • Heart disease Mother    • Stroke Mother    • Stroke Father    • Heart disease Father    • Heart disease Brother      Lab on 04/08/2019   Component Date Value Ref Range Status   • WBC 04/08/2019 4.44  3.40 - 10.80 10*3/mm3 Final   • RBC 04/08/2019 4.44  4.14 - 5.80 10*6/mm3 Final   • Hemoglobin 04/08/2019 14.0  13.0 - 17.7 g/dL Final   • Hematocrit 04/08/2019 41.5  37.5 - 51.0 % Final   • MCV 04/08/2019 93.5  79.0 - 97.0 fL Final   • MCH 04/08/2019 31.5  26.6 - 33.0 pg Final   • MCHC 04/08/2019 33.7  31.5 - 35.7 g/dL Final   • RDW 04/08/2019 13.6  12.3 - 15.4 % Final   • RDW-SD 04/08/2019 46.5  37.0 - 54.0 fl Final   • MPV 04/08/2019 12.2* 6.0 - 12.0 fL Final   • Platelets 04/08/2019 119* 140 - 450 10*3/mm3 Final   • Neutrophil % 04/08/2019 61.7  42.7 - 76.0 % Final   • Lymphocyte % 04/08/2019 23.9  19.6 - 45.3 % Final   • Monocyte % 04/08/2019 9.9  5.0 - 12.0 % Final   • Eosinophil % 04/08/2019 2.9  0.3 - 6.2 % Final   • Basophil % 04/08/2019 1.4  0.0 - 1.5 % Final   • Immature Grans % 04/08/2019 0.2  0.0 - 0.5 % Final   • Neutrophils, Absolute 04/08/2019 2.74  1.40 - 7.00 10*3/mm3 Final   • Lymphocytes, Absolute 04/08/2019 1.06  0.70 - 3.10 10*3/mm3 Final   • Monocytes, Absolute 04/08/2019 0.44  0.10 - 0.90 10*3/mm3 Final   • Eosinophils, Absolute 04/08/2019 0.13  0.00 - 0.40 10*3/mm3 Final   • Basophils, Absolute 04/08/2019 0.06  0.00 - 0.20 10*3/mm3 Final   • Immature Grans, Absolute 04/08/2019 0.01  0.00 - 0.05 10*3/mm3 Final   • nRBC 04/08/2019 0.0  0.0 - 0.0 /100 WBC Final   • Glucose 04/08/2019 71  65 - 99 mg/dL Final   • BUN 04/08/2019 10  8 - 23 mg/dL Final   • Creatinine 04/08/2019 0.74* 0.76 - 1.27 mg/dL Final   • Sodium 04/08/2019 133* 136 - 145 mmol/L Final   •  "Potassium 04/08/2019 4.2  3.5 - 5.2 mmol/L Final   • Chloride 04/08/2019 90* 98 - 107 mmol/L Final   • CO2 04/08/2019 23.3  22.0 - 29.0 mmol/L Final   • Calcium 04/08/2019 9.1  8.6 - 10.5 mg/dL Final   • Total Protein 04/08/2019 8.6* 6.0 - 8.5 g/dL Final   • Albumin 04/08/2019 4.80  3.50 - 5.20 g/dL Final   • ALT (SGPT) 04/08/2019 18  1 - 41 U/L Final   • AST (SGOT) 04/08/2019 40  1 - 40 U/L Final   • Alkaline Phosphatase 04/08/2019 63  39 - 117 U/L Final   • Total Bilirubin 04/08/2019 0.4  0.2 - 1.2 mg/dL Final   • eGFR Non African Amer 04/08/2019 107  >60 mL/min/1.73 Final   • Globulin 04/08/2019 3.8  gm/dL Final   • A/G Ratio 04/08/2019 1.3  g/dL Final   • BUN/Creatinine Ratio 04/08/2019 13.5  7.0 - 25.0 Final   • Anion Gap 04/08/2019 19.7  mmol/L Final   • Hemoglobin A1C 04/08/2019 4.78* 4.80 - 5.60 % Final   • Total Cholesterol 04/08/2019 165  0 - 200 mg/dL Final   • Triglycerides 04/08/2019 45  0 - 150 mg/dL Final   • HDL Cholesterol 04/08/2019 115* 40 - 60 mg/dL Final   • LDL Cholesterol  04/08/2019 41  0 - 100 mg/dL Final   • VLDL Cholesterol 04/08/2019 9  5 - 40 mg/dL Final   • LDL/HDL Ratio 04/08/2019 0.36   Final   • TSH 04/08/2019 1.250  0.270 - 4.200 mIU/mL Final   • Free T4 04/08/2019 1.28  0.93 - 1.70 ng/dL Final   • T3, Free 04/08/2019 3.69  2.00 - 4.40 pg/mL Final   • 25 Hydroxy, Vitamin D 04/08/2019 28.3* 30.0 - 100.0 ng/ml Final      No image results found.    [unfilled]    There is no immunization history on file for this patient.    The following portions of the patient's history were reviewed and updated as appropriate: allergies, current medications, past family history, past medical history, past social history, past surgical history and problem list.        Physical Exam  /90   Pulse 98   Temp 99.2 °F (37.3 °C)   Ht 165.1 cm (65\")   Wt 64 kg (141 lb 3.2 oz)   SpO2 98%   BMI 23.50 kg/m²     Physical Exam   Constitutional: He is oriented to person, place, and time. He appears " well-developed and well-nourished.   HENT:   Head: Normocephalic and atraumatic.   Right Ear: External ear normal.   Eyes: Conjunctivae and EOM are normal. Pupils are equal, round, and reactive to light.   Neck: Normal range of motion. Neck supple.   Cardiovascular: Normal rate, regular rhythm and normal heart sounds.   No murmur heard.  Pulmonary/Chest: Effort normal and breath sounds normal. No respiratory distress.   Abdominal: Soft. Bowel sounds are normal. He exhibits no distension. There is no tenderness.   Musculoskeletal: Normal range of motion. He exhibits no edema or deformity.   Neurological: He is alert and oriented to person, place, and time. No cranial nerve deficit.   Skin: Skin is warm. No rash noted. He is not diaphoretic. No erythema. No pallor.   Psychiatric: He has a normal mood and affect. His behavior is normal.   Nursing note and vitals reviewed.      Assessment/Plan    Diagnosis Plan   1. COPD exacerbation (CMS/HCC)     2. Tobacco abuse counseling     3. Tobacco user     4. Thrombocytopenia (CMS/HCC)     5. Hyperlipidemia, unspecified hyperlipidemia type     6. Gait instability     7. Fatty liver     8. Essential hypertension     9. Chronic obstructive pulmonary disease, unspecified COPD type (CMS/HCC)     10. At high risk for falls     11. Alcohol abuse     12. Alcohol abuse counseling and surveillance     13. Acute on chronic respiratory failure with hypoxia (CMS/HCC)            -reviewed last [facility] discharge summary  -enteririts -pt finished PO abx.  clinically he is doing better   -counseled pt to quit smoking >5 minutes   -gait instability/high fall risk - gave prescription for motorized wheelchair. He would benefit with wheelchair since pt is a high fall risk.    -recommend colonoscopy screening  -HLP - lipitor 20 mg PO qhs   -counseled pt to cut [place] n on alcohol >5 minutes. He declines nicotine patch, wellbutrin and chantis   -thrombocytopenia - likely due to liver disease -recommend  " Hematology referral with Dr. Serrano . He missed his last appt.   -alcohol abuse/fatty liver - recommend Gastroenterology referral  With RIGOBERTO Saini   -back pain - recommend x-ray of back  -COPD/COPD exacerbation/hypoxic respiratory failure.  - referred  to Pulmonology. Pt is on Qvar along with albuterol inhaler.  he is on nbeulizer.  Pt's oxygen low at 89%.  Will see if pt can see Pulmonology sooner for possible oxygen.  Stopped QVAR and gave samples last visit. Of Dulera 200/5mcg 2 puffs BID sent prescription to pharmacy.  Also will give solumedrol 120 mcg IM x 1. Today. Prednisone tapering dose . I highly recommend  Pt go to hospital for treatment of his COPD. He declined to go to hospital at this time. He states \"I will go to the ER if I am feeling worse.\" He did sign AMA last visit.  He is doing better and oxygenation is at >95%.  He almost has finished his prednisone tapering dose. He is tolerating dulera   -chest pain/abnormal EKG - pt referred to cardiology. Pt has appt Friday with Dr. Malhotra.    -HTN -stopped lisinopril-HCTZ . Pt is on lisionpril 20 mg dialy .Will go up from lisionpril 20 to 40 mg daily.    -recommend aspirin 81 mg daily  -GERD - protonix 40 mg PO q daily. Possible need EGD and colonoscopy screening  -recheck in 2 weeks   -since pt signed AMA today. Advised to be safe and proceed to ER or call 911 if symptoms severe         This document has been electronically signed by Toby Harrell MD on May 30, 2019 11:23 AM                  This document has been electronically signed by Toby Harrell MD on May 30, 2019 11:23 AM     "

## 2019-05-30 ENCOUNTER — OFFICE VISIT (OUTPATIENT)
Dept: FAMILY MEDICINE CLINIC | Facility: CLINIC | Age: 64
End: 2019-05-30

## 2019-05-30 ENCOUNTER — TELEPHONE (OUTPATIENT)
Dept: FAMILY MEDICINE CLINIC | Facility: CLINIC | Age: 64
End: 2019-05-30

## 2019-05-30 VITALS
HEART RATE: 98 BPM | TEMPERATURE: 99.2 F | SYSTOLIC BLOOD PRESSURE: 170 MMHG | OXYGEN SATURATION: 98 % | WEIGHT: 141.2 LBS | DIASTOLIC BLOOD PRESSURE: 90 MMHG | HEIGHT: 65 IN | BODY MASS INDEX: 23.53 KG/M2

## 2019-05-30 DIAGNOSIS — K76.0 FATTY LIVER: ICD-10-CM

## 2019-05-30 DIAGNOSIS — I10 ESSENTIAL HYPERTENSION: ICD-10-CM

## 2019-05-30 DIAGNOSIS — Z71.41 ALCOHOL ABUSE COUNSELING AND SURVEILLANCE: ICD-10-CM

## 2019-05-30 DIAGNOSIS — Z72.0 TOBACCO USER: ICD-10-CM

## 2019-05-30 DIAGNOSIS — R26.81 GAIT INSTABILITY: ICD-10-CM

## 2019-05-30 DIAGNOSIS — J96.21 ACUTE ON CHRONIC RESPIRATORY FAILURE WITH HYPOXIA (HCC): ICD-10-CM

## 2019-05-30 DIAGNOSIS — Z91.81 AT HIGH RISK FOR FALLS: ICD-10-CM

## 2019-05-30 DIAGNOSIS — J44.1 COPD EXACERBATION (HCC): Primary | ICD-10-CM

## 2019-05-30 DIAGNOSIS — J44.9 CHRONIC OBSTRUCTIVE PULMONARY DISEASE, UNSPECIFIED COPD TYPE (HCC): ICD-10-CM

## 2019-05-30 DIAGNOSIS — F10.10 ALCOHOL ABUSE: ICD-10-CM

## 2019-05-30 DIAGNOSIS — D69.6 THROMBOCYTOPENIA (HCC): ICD-10-CM

## 2019-05-30 DIAGNOSIS — E78.5 HYPERLIPIDEMIA, UNSPECIFIED HYPERLIPIDEMIA TYPE: ICD-10-CM

## 2019-05-30 DIAGNOSIS — Z71.6 TOBACCO ABUSE COUNSELING: ICD-10-CM

## 2019-05-30 PROCEDURE — 99214 OFFICE O/P EST MOD 30 MIN: CPT | Performed by: FAMILY MEDICINE

## 2019-05-30 PROCEDURE — 99406 BEHAV CHNG SMOKING 3-10 MIN: CPT | Performed by: FAMILY MEDICINE

## 2019-05-30 RX ORDER — LISINOPRIL 40 MG/1
40 TABLET ORAL DAILY
Qty: 30 TABLET | Refills: 3 | Status: SHIPPED | OUTPATIENT
Start: 2019-05-30 | End: 2019-08-01

## 2019-05-30 NOTE — PATIENT INSTRUCTIONS
Go up on lisinopril from 20 to 40 mg daily.      Take 2 pills of lisinopril  = 40 mg daily.    Recheck in 2 weeks    See Dr. Spain tomorrow.

## 2019-06-16 NOTE — PROGRESS NOTES
Subjective:  Khadar Dent is a 64 y.o. male who presents for       Patient Active Problem List   Diagnosis   • Annual physical exam   • General medical examination   • Encounter for screening for diabetes mellitus   • Encounter for screening for cardiovascular disorders   • Encounter for screening for endocrine disorder   • Chronic obstructive pulmonary disease (CMS/HCC)   • Tobacco abuse counseling   • Tobacco user   • Dyspnea   • Back pain   • Elevated blood pressure reading   • Alcohol abuse counseling and surveillance   • Alcohol abuse   • Tachycardia   • Chest pain   • Essential hypertension   • Thrombocytopenia (CMS/HCC)   • Hyperlipidemia   • Fatty liver   • Enteritis   • Nausea and vomiting   • COPD exacerbation (CMS/HCC)   • Acute on chronic respiratory failure with hypoxia (CMS/HCC)   • At high risk for falls   • Gait instability   • DDD (degenerative disc disease), lumbar   • Chronic low back pain with sciatica           Current Outpatient Medications:   •  albuterol (ACCUNEB) 1.25 MG/3ML nebulizer solution, Take 3 mL by nebulization Every 6 (Six) Hours As Needed for Wheezing., Disp: 1 vial, Rfl: 3  •  albuterol sulfate  (90 Base) MCG/ACT inhaler, Inhale 2 puffs Every 6 (Six) Hours As Needed for Wheezing., Disp: 1 inhaler, Rfl: 3  •  atorvastatin (LIPITOR) 20 MG tablet, Take 1 tablet by mouth Daily., Disp: 30 tablet, Rfl: 3  •  cyclobenzaprine (FLEXERIL) 5 MG tablet, Take 1 tablet by mouth 3 (Three) Times a Day As Needed for Muscle Spasms., Disp: 90 tablet, Rfl: 2  •  lisinopril (PRINIVIL,ZESTRIL) 40 MG tablet, Take 1 tablet by mouth Daily., Disp: 30 tablet, Rfl: 3  •  metoprolol succinate XL (TOPROL XL) 50 MG 24 hr tablet, Take 1 tablet by mouth Daily., Disp: 30 tablet, Rfl: 3  •  mometasone-formoterol (DULERA) 200-5 MCG/ACT inhaler, Inhale 2 puffs 2 (Two) Times a Day., Disp: 1 inhaler, Rfl: 0  •  nicotine (NICODERM CQ) 21 MG/24HR patch, Place 1 patch on the skin as directed by provider  Daily., Disp: 30 patch, Rfl: 3  •  pantoprazole (PROTONIX) 40 MG EC tablet, Take 40 mg by mouth Daily., Disp: , Rfl:   •  sodium chloride 1 g tablet, Take 1 g by mouth Daily., Disp: , Rfl:     Current Facility-Administered Medications:   •  methylPREDNISolone sodium succinate (SOLU-Medrol) injection 125 mg, 125 mg, Intravenous, Q6H, Toby Harrell MD, 125 mg at 05/22/19 1020    HPI        5/22/19 PT is 63 yo male with history of multiple joitn pain, overweight,  Fatty liver, alcohol abuse, tobacco use, Hypertension, COPD, GERD coming in for recheck.  I have recent Discharge records from [facility]. PT was admitted on 3/10/19 and discharged on 3/12/19 for nausea vomiting/severe dehydration/acute renal failure. Pt got better. He was set up with Pulmonology and Cardiology referral.  Pt recently had labwork. That showed low vitamin D, thyroid studies normal . LDL is at 41 with HDL at 115. CMP showed normal renal and liver function. CBC showed thrombocytopenia at 119. With normal hemoglobin.  On discharge summary CT of abdomen with contrast showed mild fatty liver disease. He continues to drink alcohol and a 6 pack of beer would last him for a day and half.  He has been drinking >40 years.    He recently saw Dr. Spain and was started on QVAR along with nebulizer. He is down to 5 cigarettes a day. He has been using the nicotine patch.  He was recently admitted to [facility] on 4/24/19 and discharged on 4/26/19 for enteritis, dehydration. COPD exacerbation, dysphagia. He initially presented to ER with abdominal pain/ wheezing and shortness of air. He had CT of abdomen that showed thickening of proximal small bowel suggesting enteririts. He was given IV fluid and antibiotics such as zoysn and flagyl. He also had dysphagia and EGD was performed that improved his symptoms. He was given PO antibiotics levaquin and flagyl for 5 days.  Pt continues to drink alcohol and has cut down on smoking to 1/2 ppd. He has problems with breathing  as of now. He has appt with Pulmonologist soon. His oxygen today is at 89%.  Pt also has chronic back pain and gait instability. He is requesting a motorized  wheelchair.  He has trouble with ambulating and gait       5/29/19 pt is here for recheck on COPD exacerbation. On last visit he was given solumedrol 125 mg IM injection along with prednisone tapering dose. He was also given dulera to help with breathing. He was recommended to go to [facility] ER but pt refused and signed AMA.  He was also set up with appt with Pulmonologist and has appt with PUlmonologist soon.  His breathing is somewhat better. He continues to smoke and is taking his prednisone tapering dose along with dulera 200/5 mg     6/19/19 pt is here for echeck and followup. Pt has upcoming appt with Hematologist soon for thromboctyopenia. He also has COPD and is on Dulera. He conitnues to smoke 1/2 ppd. He has an upcoming appt with Dr. Spain tomorrow for his COPD. His BP is elevated today despite taking lisinopril 40 mg. No chest pain. Oxygen saturation stable today.               Abdominal Pain   This is a chronic problem. The current episode started more than 1 year ago. The onset quality is gradual. The problem has been gradually worsening. The pain is at a severity of 4/10. The pain is moderate. The quality of the pain is aching. The abdominal pain does not radiate. Associated symptoms include headaches. Pertinent negatives include no anorexia, arthralgias, belching, constipation, diarrhea, dysuria, fever, flatus, hematochezia, hematuria, melena, myalgias, nausea, vomiting or weight loss. The pain is aggravated by bowel movement and deep breathing. The pain is relieved by nothing. He has tried nothing for the symptoms. Prior diagnostic workup includes CT scan and upper endoscopy. His past medical history is significant for GERD. There is no history of abdominal surgery, colon cancer, Crohn's disease, gallstones, pancreatitis, PUD or ulcerative  colitis.    Chest Pain    The current episode started more than 1 year ago. The onset quality is gradual. The problem occurs intermittently. The problem has been waxing and waning. The pain is present in the substernal region and lateral region. The pain is at a severity of 5/10. The pain is moderate. The quality of the pain is described as numbness, pressure and tightness. Associated symptoms include back pain, dizziness, exertional chest pressure, headaches, numbness, palpitations, shortness of breath and weakness. Pertinent negatives include no abdominal pain, claudication, cough, diaphoresis, fever, hemoptysis, irregular heartbeat, leg pain, lower extremity edema, malaise/fatigue, nausea, near-syncope, orthopnea, PND, sputum production, syncope or vomiting. The pain is aggravated by breathing, coughing, deep breathing, movement, walking and exertion. The treatment provided no relief. Risk factors include alcohol intake, being elderly, lack of exercise, smoking/tobacco exposure, sedentary lifestyle and stress.   His past medical history is significant for hypertension.   Pertinent negatives for past medical history include no aneurysm, no anxiety/panic attacks, no aortic aneurysm, no aortic dissection, no arrhythmia, no bicuspid aortic valve, no CAD, no cancer, no congenital heart disease, no connective tissue disease, no COPD, no CHF, no diabetes, no DVT, no hyperhomocysteinemia, no hyperlipidemia, no Kawasaki disease, no Marfan's syndrome, no MI, no mitral valve prolapse, no pacemaker, no PE, no PVD, no recent injury, no rheumatic fever, no seizures, no sickle cell disease, no sleep apnea, no spontaneous pneumothorax, no stimulant use, no strokes, no thyroid problem, no TIA, Shin syndrome and no valve disorder.   Hypertension   This is a chronic problem. The current episode started more than 1 year ago. The problem has been waxing and waning since onset. The problem is uncontrolled. Associated symptoms  include chest pain, headaches, neck pain, palpitations and shortness of breath. Pertinent negatives include no anxiety, malaise/fatigue, orthopnea, peripheral edema, PND or sweats. Risk factors for coronary artery disease include male gender, sedentary lifestyle, smoking/tobacco exposure and dyslipidemia. Past treatments include ACE inhibitors and diuretics. Current antihypertension treatment includes ACE inhibitors and diuretics. The current treatment provides no improvement. There are no compliance problems.  There is no history of angina, kidney disease, CAD/MI, CVA, heart failure, left ventricular hypertrophy, PVD or retinopathy. There is no history of chronic renal disease, coarctation of the aorta, hyperaldosteronism, hypercortisolism, hyperparathyroidism, a hypertension causing med, pheochromocytoma, renovascular disease, sleep apnea or a thyroid problem.   Back Pain   This is a chronic problem. The current episode started more than 1 year ago. The problem occurs constantly. The problem is unchanged. The pain is present in the lumbar spine and sacro-iliac. The quality of the pain is described as aching. Stiffness is present all day. Associated symptoms include chest pain, headaches, numbness and weakness. Pertinent negatives include no abdominal pain, bladder incontinence, bowel incontinence, dysuria, fever, leg pain, paresis, paresthesias, pelvic pain, perianal numbness, tingling or weight loss. The treatment provided no relief.   Alcohol Problem   This is a chronic problem. The current episode started more than 1 year ago. The problem occurs constantly. The problem has been unchanged. Associated symptoms include arthralgias, chest pain, fatigue, headaches, joint swelling, myalgias, neck pain, numbness and weakness. Pertinent negatives include no abdominal pain, anorexia, change in bowel habit, chills, congestion, coughing, diaphoresis, fever, nausea, rash, sore throat, swollen glands, urinary  symptoms, vertigo, visual change or vomiting. Nothing aggravates the symptoms. He has tried nothing for the symptoms. The treatment provided no relief.    COPD   This is a new problem. The current episode started more than 1 year ago. The problem occurs constantly. Associated symptoms include arthralgias, chest pain, fatigue, headaches and weakness. Pertinent negatives include no abdominal pain, anorexia, change in bowel habit, chills, congestion, coughing, diaphoresis, fever, joint swelling, myalgias, nausea, neck pain or numbness.     Review of Systems  Review of Systems   Constitutional: Positive for activity change. Negative for appetite change, chills, diaphoresis, fatigue and fever.   HENT: Negative for congestion, postnasal drip, rhinorrhea, sinus pressure, sinus pain, sneezing, sore throat, trouble swallowing and voice change.    Respiratory: Positive for cough and shortness of breath. Negative for choking, chest tightness, wheezing and stridor.    Cardiovascular: Negative for chest pain.   Gastrointestinal: Negative for diarrhea, nausea and vomiting.   Musculoskeletal: Positive for arthralgias, back pain, gait problem, joint swelling and myalgias.   Neurological: Positive for weakness. Negative for headaches.       Patient Active Problem List   Diagnosis   • Annual physical exam   • General medical examination   • Encounter for screening for diabetes mellitus   • Encounter for screening for cardiovascular disorders   • Encounter for screening for endocrine disorder   • Chronic obstructive pulmonary disease (CMS/HCC)   • Tobacco abuse counseling   • Tobacco user   • Dyspnea   • Back pain   • Elevated blood pressure reading   • Alcohol abuse counseling and surveillance   • Alcohol abuse   • Tachycardia   • Chest pain   • Essential hypertension   • Thrombocytopenia (CMS/HCC)   • Hyperlipidemia   • Fatty liver   • Enteritis   • Nausea and vomiting   • COPD exacerbation (CMS/HCC)   • Acute on chronic respiratory  failure with hypoxia (CMS/HCC)   • At high risk for falls   • Gait instability   • DDD (degenerative disc disease), lumbar   • Chronic low back pain with sciatica     No past surgical history on file.  Social History     Socioeconomic History   • Marital status:      Spouse name: Not on file   • Number of children: Not on file   • Years of education: Not on file   • Highest education level: Not on file   Tobacco Use   • Smoking status: Current Every Day Smoker   • Smokeless tobacco: Never Used   Substance and Sexual Activity   • Alcohol use: Yes     Family History   Problem Relation Age of Onset   • Heart disease Mother    • Stroke Mother    • Stroke Father    • Heart disease Father    • Heart disease Brother      Lab on 04/08/2019   Component Date Value Ref Range Status   • WBC 04/08/2019 4.44  3.40 - 10.80 10*3/mm3 Final   • RBC 04/08/2019 4.44  4.14 - 5.80 10*6/mm3 Final   • Hemoglobin 04/08/2019 14.0  13.0 - 17.7 g/dL Final   • Hematocrit 04/08/2019 41.5  37.5 - 51.0 % Final   • MCV 04/08/2019 93.5  79.0 - 97.0 fL Final   • MCH 04/08/2019 31.5  26.6 - 33.0 pg Final   • MCHC 04/08/2019 33.7  31.5 - 35.7 g/dL Final   • RDW 04/08/2019 13.6  12.3 - 15.4 % Final   • RDW-SD 04/08/2019 46.5  37.0 - 54.0 fl Final   • MPV 04/08/2019 12.2* 6.0 - 12.0 fL Final   • Platelets 04/08/2019 119* 140 - 450 10*3/mm3 Final   • Neutrophil % 04/08/2019 61.7  42.7 - 76.0 % Final   • Lymphocyte % 04/08/2019 23.9  19.6 - 45.3 % Final   • Monocyte % 04/08/2019 9.9  5.0 - 12.0 % Final   • Eosinophil % 04/08/2019 2.9  0.3 - 6.2 % Final   • Basophil % 04/08/2019 1.4  0.0 - 1.5 % Final   • Immature Grans % 04/08/2019 0.2  0.0 - 0.5 % Final   • Neutrophils, Absolute 04/08/2019 2.74  1.40 - 7.00 10*3/mm3 Final   • Lymphocytes, Absolute 04/08/2019 1.06  0.70 - 3.10 10*3/mm3 Final   • Monocytes, Absolute 04/08/2019 0.44  0.10 - 0.90 10*3/mm3 Final   • Eosinophils, Absolute 04/08/2019 0.13  0.00 - 0.40 10*3/mm3 Final   • Basophils,  Absolute 04/08/2019 0.06  0.00 - 0.20 10*3/mm3 Final   • Immature Grans, Absolute 04/08/2019 0.01  0.00 - 0.05 10*3/mm3 Final   • nRBC 04/08/2019 0.0  0.0 - 0.0 /100 WBC Final   • Glucose 04/08/2019 71  65 - 99 mg/dL Final   • BUN 04/08/2019 10  8 - 23 mg/dL Final   • Creatinine 04/08/2019 0.74* 0.76 - 1.27 mg/dL Final   • Sodium 04/08/2019 133* 136 - 145 mmol/L Final   • Potassium 04/08/2019 4.2  3.5 - 5.2 mmol/L Final   • Chloride 04/08/2019 90* 98 - 107 mmol/L Final   • CO2 04/08/2019 23.3  22.0 - 29.0 mmol/L Final   • Calcium 04/08/2019 9.1  8.6 - 10.5 mg/dL Final   • Total Protein 04/08/2019 8.6* 6.0 - 8.5 g/dL Final   • Albumin 04/08/2019 4.80  3.50 - 5.20 g/dL Final   • ALT (SGPT) 04/08/2019 18  1 - 41 U/L Final   • AST (SGOT) 04/08/2019 40  1 - 40 U/L Final   • Alkaline Phosphatase 04/08/2019 63  39 - 117 U/L Final   • Total Bilirubin 04/08/2019 0.4  0.2 - 1.2 mg/dL Final   • eGFR Non African Amer 04/08/2019 107  >60 mL/min/1.73 Final   • Globulin 04/08/2019 3.8  gm/dL Final   • A/G Ratio 04/08/2019 1.3  g/dL Final   • BUN/Creatinine Ratio 04/08/2019 13.5  7.0 - 25.0 Final   • Anion Gap 04/08/2019 19.7  mmol/L Final   • Hemoglobin A1C 04/08/2019 4.78* 4.80 - 5.60 % Final   • Total Cholesterol 04/08/2019 165  0 - 200 mg/dL Final   • Triglycerides 04/08/2019 45  0 - 150 mg/dL Final   • HDL Cholesterol 04/08/2019 115* 40 - 60 mg/dL Final   • LDL Cholesterol  04/08/2019 41  0 - 100 mg/dL Final   • VLDL Cholesterol 04/08/2019 9  5 - 40 mg/dL Final   • LDL/HDL Ratio 04/08/2019 0.36   Final   • TSH 04/08/2019 1.250  0.270 - 4.200 mIU/mL Final   • Free T4 04/08/2019 1.28  0.93 - 1.70 ng/dL Final   • T3, Free 04/08/2019 3.69  2.00 - 4.40 pg/mL Final   • 25 Hydroxy, Vitamin D 04/08/2019 28.3* 30.0 - 100.0 ng/ml Final      XR Spine Lumbar AP & Lateral  Narrative: PROCEDURE: Lumbar spine 3 views    REASON FOR EXAM: back pain, M54.9 Dorsalgia, unspecified    FINDINGS: . Lumbar spine vertebral body heights and alignment  "are  normal. There is no evidence of fracture or dislocation. Moderate  to severe loss of L5-S1 intervertebral disc space height with  associated anterior osteophytosis consistent with degenerative  disc disease. Otherwise intervertebral disc spaces are intact.  Impression: 1.Moderate to severe loss of L5-S1 intervertebral disc space  height with associated anterior osteophytosis consistent with  degenerative disc disease.   2. No acute lumbar spine abnormality..    Electronically signed by:  Manjeet Hughes MD  5/30/2019 2:31 PM CDT  Workstation: [id number]  XR Chest PA & Lateral  Narrative: Chest PA and lateral     CLINICAL INDICATION: Shortness of breath    COMPARISON: None    FINDINGS: Cardiac silhouette is normal in size. Pulmonary  vascularity is unremarkable.   Linear fibrotic changes, scarring right apex.  No focal infiltrate or consolidation.  No pleural effusion.  No  pneumothorax.  Flattening both diaphragms and increase in the retrosternal  airspace indicating air trapping, COPD.  Impression: CONCLUSION: Flattening both diaphragms and increase in the  retrosternal airspace indicating air trapping, COPD.    Electronically signed by:  Madi Guardado MD  5/30/2019 2:12 PM CDT  Workstation: [id number]    @[employer]@    There is no immunization history on file for this patient.    The following portions of the patient's history were reviewed and updated as appropriate: allergies, current medications, past family history, past medical history, past social history, past surgical history and problem list.        Physical Exam  /86   Pulse 98   Temp 99.4 °F (37.4 °C)   Ht 165.1 cm (65\")   Wt 63.9 kg (140 lb 12.8 oz)   SpO2 98%   BMI 23.43 kg/m²     Physical Exam   Constitutional: He is oriented to person, place, and time. He appears well-developed and well-nourished.   HENT:   Head: Normocephalic and atraumatic.   Right Ear: External ear normal.   Eyes: Conjunctivae and EOM are normal. Pupils are equal, round, " and reactive to light.   Neck: Normal range of motion. Neck supple.   Cardiovascular: Normal rate, regular rhythm and normal heart sounds.   No murmur heard.  Pulmonary/Chest: Effort normal and breath sounds normal. No respiratory distress.   Decreased breath sounds    Abdominal: Soft. Bowel sounds are normal. He exhibits no distension. There is no tenderness.   Musculoskeletal: He exhibits tenderness. He exhibits no edema or deformity.        Lumbar back: He exhibits decreased range of motion, tenderness, bony tenderness, pain and spasm.   Get up and go test >10 seconds   Neurological: He is alert and oriented to person, place, and time. No cranial nerve deficit.   Skin: Skin is warm. No rash noted. He is not diaphoretic. No erythema. No pallor.   Psychiatric: He has a normal mood and affect. His behavior is normal.   Nursing note and vitals reviewed.      Assessment/Plan    Diagnosis Plan   1. COPD exacerbation (CMS/[facility])  Ambulatory Referral to Home Health   2. Tobacco abuse counseling  Ambulatory Referral to Home Health   3. Tobacco user  Ambulatory Referral to Home Health   4. Thrombocytopenia (CMS/[facility])  Ambulatory Referral to Home Health   5. Hyperlipidemia, unspecified hyperlipidemia type  Ambulatory Referral to Home Health   6. Gait instability  Ambulatory Referral to Home Health   7. Fatty liver  Ambulatory Referral to Home Health   8. Chronic obstructive pulmonary disease, unspecified COPD type (CMS/[facility])  Ambulatory Referral to Home Health   9. At high risk for falls  Ambulatory Referral to Home Health   10. Alcohol abuse  Ambulatory Referral to Home Health   11. Alcohol abuse counseling and surveillance  Ambulatory Referral to Home Health   12. Acute on chronic respiratory failure with hypoxia (CMS/[facility])  Ambulatory Referral to Home Health   13. DDD (degenerative disc disease), lumbar  Ambulatory Referral to Home Health   14. Chronic low back pain with sciatica, sciatica laterality unspecified, unspecified  back pain laterality  Ambulatory Referral to Home Health                   -reviewed last [facility] discharge summary  -enteririts -pt finished PO abx.  clinically he is doing better   -counseled pt to quit smoking >5 minutes. Pt has cut down. He is using nicotine patches  - will set up [facility] for COPD management. Medication management   -gait instability/high fall risk - gave prescription for motorized wheelchair. He would benefit with wheelchair since pt is a high fall risk.    -recommend colonoscopy screening  -HLP - lipitor 20 mg PO qhs   -counseled pt to cut [place] n on alcohol >5 minutes. He declines nicotine patch, wellbutrin and chantis   -thrombocytopenia - likely due to liver disease -recommend  Hematology referral with Dr. Serrano . He missed his last appt.   -alcohol abuse/fatty liver - recommend Gastroenterology referral  With RIGOBERTO Saini   -back pain - recommend x-ray of back  -COPD/COPD exacerbation/hypoxic respiratory failure.  - referred  to Pulmonology Pt is on dulera inhaler. He also    -chest pain/abnormal EKG - pt referred to cardiology. Pt has appt Friday with Dr. Malhotra.    -HTN -stopped lisinopril-HCTZ . Pt is on lisionpril 20 mg dialy  BP is still elevated will start on toprol XL 50 mgdaily.    -recommend aspirin 81 mg daily but he is intoleratn to aspirin   -GERD - protonix 40 mg PO q daily. Possible need EGD and colonoscopy screening  -recheck in 2 weeks   -since pt signed AMA today. Advised to be safe and proceed to ER or call 911 if symptoms severe         This document has been electronically signed by Toby Harrell MD on June 19, 2019 10:51 AM                    This document has been electronically signed by Toby Harrell MD on June 19, 2019 10:51 AM

## 2019-06-18 PROBLEM — M51.36 DDD (DEGENERATIVE DISC DISEASE), LUMBAR: Status: ACTIVE | Noted: 2019-06-18

## 2019-06-18 PROBLEM — M54.40 CHRONIC LOW BACK PAIN WITH SCIATICA: Status: ACTIVE | Noted: 2019-06-18

## 2019-06-18 PROBLEM — G89.29 CHRONIC LOW BACK PAIN WITH SCIATICA: Status: ACTIVE | Noted: 2019-06-18

## 2019-06-19 ENCOUNTER — OFFICE VISIT (OUTPATIENT)
Dept: FAMILY MEDICINE CLINIC | Facility: CLINIC | Age: 64
End: 2019-06-19

## 2019-06-19 VITALS
WEIGHT: 140.8 LBS | HEART RATE: 98 BPM | OXYGEN SATURATION: 98 % | HEIGHT: 65 IN | SYSTOLIC BLOOD PRESSURE: 140 MMHG | DIASTOLIC BLOOD PRESSURE: 86 MMHG | BODY MASS INDEX: 23.46 KG/M2 | TEMPERATURE: 99.4 F

## 2019-06-19 DIAGNOSIS — R26.81 GAIT INSTABILITY: ICD-10-CM

## 2019-06-19 DIAGNOSIS — Z71.41 ALCOHOL ABUSE COUNSELING AND SURVEILLANCE: ICD-10-CM

## 2019-06-19 DIAGNOSIS — D69.6 THROMBOCYTOPENIA (HCC): ICD-10-CM

## 2019-06-19 DIAGNOSIS — E78.5 HYPERLIPIDEMIA, UNSPECIFIED HYPERLIPIDEMIA TYPE: ICD-10-CM

## 2019-06-19 DIAGNOSIS — M54.40 CHRONIC LOW BACK PAIN WITH SCIATICA, SCIATICA LATERALITY UNSPECIFIED, UNSPECIFIED BACK PAIN LATERALITY: ICD-10-CM

## 2019-06-19 DIAGNOSIS — M51.36 DDD (DEGENERATIVE DISC DISEASE), LUMBAR: ICD-10-CM

## 2019-06-19 DIAGNOSIS — J96.21 ACUTE ON CHRONIC RESPIRATORY FAILURE WITH HYPOXIA (HCC): ICD-10-CM

## 2019-06-19 DIAGNOSIS — J44.9 CHRONIC OBSTRUCTIVE PULMONARY DISEASE, UNSPECIFIED COPD TYPE (HCC): ICD-10-CM

## 2019-06-19 DIAGNOSIS — J44.1 COPD EXACERBATION (HCC): Primary | ICD-10-CM

## 2019-06-19 DIAGNOSIS — Z91.81 AT HIGH RISK FOR FALLS: ICD-10-CM

## 2019-06-19 DIAGNOSIS — Z71.6 TOBACCO ABUSE COUNSELING: ICD-10-CM

## 2019-06-19 DIAGNOSIS — G89.29 CHRONIC LOW BACK PAIN WITH SCIATICA, SCIATICA LATERALITY UNSPECIFIED, UNSPECIFIED BACK PAIN LATERALITY: ICD-10-CM

## 2019-06-19 DIAGNOSIS — K76.0 FATTY LIVER: ICD-10-CM

## 2019-06-19 DIAGNOSIS — F10.10 ALCOHOL ABUSE: ICD-10-CM

## 2019-06-19 DIAGNOSIS — Z72.0 TOBACCO USER: ICD-10-CM

## 2019-06-19 PROCEDURE — 99214 OFFICE O/P EST MOD 30 MIN: CPT | Performed by: FAMILY MEDICINE

## 2019-06-19 RX ORDER — CYCLOBENZAPRINE HCL 5 MG
5 TABLET ORAL 3 TIMES DAILY PRN
Qty: 90 TABLET | Refills: 2 | Status: SHIPPED | OUTPATIENT
Start: 2019-06-19 | End: 2019-10-11 | Stop reason: SDUPTHER

## 2019-06-19 RX ORDER — METOPROLOL SUCCINATE 50 MG/1
50 TABLET, EXTENDED RELEASE ORAL DAILY
Qty: 30 TABLET | Refills: 3 | Status: SHIPPED | OUTPATIENT
Start: 2019-06-19 | End: 2019-08-01

## 2019-06-19 NOTE — PATIENT INSTRUCTIONS
Bring blood pressure readings next visit .      New medication toprol XL 50 mg daily for blood pressure take along with lisinopril 40 mg daily    appt with Hematologist Dr. Serrano  on 6/27/19 here in this clinic at 1:15 pm     Metoprolol extended-release tablets  What is this medicine?  METOPROLOL (me TOE proe lole) is a beta-blocker. Beta-blockers reduce the workload on the heart and help it to beat more regularly. This medicine is used to treat high blood pressure and to prevent chest pain. It is also used to after a heart attack and to prevent an additional heart attack from occurring.  This medicine may be used for other purposes; ask your health care provider or pharmacist if you have questions.  COMMON BRAND NAME(S): toprol, Toprol XL  What should I tell my health care provider before I take this medicine?  They need to know if you have any of these conditions:  -diabetes  -heart or vessel disease like slow heart rate, worsening heart failure, heart block, sick sinus syndrome or Raynaud's disease  -kidney disease  -liver disease  -lung or breathing disease, like asthma or emphysema  -pheochromocytoma  -thyroid disease  -an unusual or allergic reaction to metoprolol, other beta-blockers, medicines, foods, dyes, or preservatives  -pregnant or trying to get pregnant  -breast-feeding  How should I use this medicine?  Take this medicine by mouth with a glass of water. Follow the directions on the prescription label. Do not crush or chew. Take this medicine with or immediately after meals. Take your doses at regular intervals. Do not take more medicine than directed. Do not stop taking this medicine suddenly. This could lead to serious heart-related effects.  Talk to your pediatrician regarding the use of this medicine in children. While this drug may be prescribed for children as young as 6 years for selected conditions, precautions do apply.  Overdosage: If you think you have taken too much of this medicine contact  a poison control center or emergency room at once.  NOTE: This medicine is only for you. Do not share this medicine with others.  What if I miss a dose?  If you miss a dose, take it as soon as you can. If it is almost time for your next dose, take only that dose. Do not take double or extra doses.  What may interact with this medicine?  This medicine may interact with the following medications:  -certain medicines for blood pressure, heart disease, irregular heart beat  -certain medicines for depression, like monoamine oxidase (MAO) inhibitors, fluoxetine, or paroxetine  -clonidine  -dobutamine  -epinephrine  -isoproterenol  -reserpine  This list may not describe all possible interactions. Give your health care provider a list of all the medicines, herbs, non-prescription drugs, or dietary supplements you use. Also tell them if you smoke, drink alcohol, or use illegal drugs. Some items may interact with your medicine.  What should I watch for while using this medicine?  Visit your doctor or health care professional for regular check ups. Contact your doctor right away if your symptoms worsen. Check your blood pressure and pulse rate regularly. Ask your health care professional what your blood pressure and pulse rate should be, and when you should contact them.  You may get drowsy or dizzy. Do not drive, use machinery, or do anything that needs mental alertness until you know how this medicine affects you. Do not sit or stand up quickly, especially if you are an older patient. This reduces the risk of dizzy or fainting spells. Contact your doctor if these symptoms continue. Alcohol may interfere with the effect of this medicine. Avoid alcoholic drinks.  What side effects may I notice from receiving this medicine?  Side effects that you should report to your doctor or health care professional as soon as possible:  -allergic reactions like skin rash, itching or hives  -cold or numb hands or  feet  -depression  -difficulty breathing  -faint  -fever with sore throat  -irregular heartbeat, chest pain  -rapid weight gain  -swollen legs or ankles  Side effects that usually do not require medical attention (report to your doctor or health care professional if they continue or are bothersome):  -anxiety or nervousness  -change in sex drive or performance  -dry skin  -headache  -nightmares or trouble sleeping  -short term memory loss  -stomach upset or diarrhea  -unusually tired  This list may not describe all possible side effects. Call your doctor for medical advice about side effects. You may report side effects to FDA at 0-214-IEL-0524.  Where should I keep my medicine?  Keep out of the reach of children.  Store at room temperature between 15 and 30 degrees C (59 and 86 degrees F). Throw away any unused medicine after the expiration date.  NOTE: This sheet is a summary. It may not cover all possible information. If you have questions about this medicine, talk to your doctor, pharmacist, or health care provider.  © 2019 [employer]/Gold [employer] (2014-08-22 14:41:37)

## 2019-06-26 ENCOUNTER — TELEPHONE (OUTPATIENT)
Dept: FAMILY MEDICINE CLINIC | Facility: CLINIC | Age: 64
End: 2019-06-26

## 2019-06-27 ENCOUNTER — TELEPHONE (OUTPATIENT)
Dept: FAMILY MEDICINE CLINIC | Facility: CLINIC | Age: 64
End: 2019-06-27

## 2019-06-27 ENCOUNTER — APPOINTMENT (OUTPATIENT)
Dept: ONCOLOGY | Facility: HOSPITAL | Age: 64
End: 2019-06-27

## 2019-06-27 ENCOUNTER — APPOINTMENT (OUTPATIENT)
Dept: ONCOLOGY | Facility: CLINIC | Age: 64
End: 2019-06-27

## 2019-07-08 ENCOUNTER — TELEPHONE (OUTPATIENT)
Dept: FAMILY MEDICINE CLINIC | Facility: CLINIC | Age: 64
End: 2019-07-08

## 2019-07-08 RX ORDER — ONDANSETRON 8 MG/1
8 TABLET, ORALLY DISINTEGRATING ORAL EVERY 8 HOURS PRN
Qty: 90 TABLET | Refills: 3 | Status: SHIPPED | OUTPATIENT
Start: 2019-07-08

## 2019-07-08 RX ORDER — PANTOPRAZOLE SODIUM 40 MG/1
TABLET, DELAYED RELEASE ORAL
Qty: 30 TABLET | Refills: 0 | Status: SHIPPED | OUTPATIENT
Start: 2019-07-08 | End: 2019-07-10 | Stop reason: SDUPTHER

## 2019-07-08 NOTE — TELEPHONE ENCOUNTER
Chelsi With [facility] called me about pt on 7/8/19. Pt was having nausea/vomiting and abdominal pain.  Pt also reported chest pain. Pt was advised to proceed to ER or call 911 but pt refused to go.  I advised Home Health nurse to document pt being AMA.  I will send zofran 8 mg every 8 hours to his pharmacy. Home Health will continue to contact me.            This document has been electronically signed by Toby Harrell MD on July 8, 2019 1:05 PM

## 2019-07-10 ENCOUNTER — LAB (OUTPATIENT)
Dept: ONCOLOGY | Facility: HOSPITAL | Age: 64
End: 2019-07-10

## 2019-07-10 ENCOUNTER — CONSULT (OUTPATIENT)
Dept: ONCOLOGY | Facility: CLINIC | Age: 64
End: 2019-07-10

## 2019-07-10 VITALS
SYSTOLIC BLOOD PRESSURE: 128 MMHG | HEART RATE: 99 BPM | WEIGHT: 144 LBS | DIASTOLIC BLOOD PRESSURE: 65 MMHG | HEIGHT: 65 IN | BODY MASS INDEX: 23.99 KG/M2 | TEMPERATURE: 99 F | OXYGEN SATURATION: 95 % | RESPIRATION RATE: 20 BRPM

## 2019-07-10 DIAGNOSIS — D69.6 THROMBOCYTOPENIA (HCC): Primary | ICD-10-CM

## 2019-07-10 DIAGNOSIS — Z71.6 ENCOUNTER FOR TOBACCO USE CESSATION COUNSELING: ICD-10-CM

## 2019-07-10 DIAGNOSIS — F10.10 ALCOHOL ABUSE: ICD-10-CM

## 2019-07-10 DIAGNOSIS — R07.9 CHEST PAIN, UNSPECIFIED TYPE: ICD-10-CM

## 2019-07-10 DIAGNOSIS — K70.10 STEATOHEPATITIS, ALCOHOLIC: ICD-10-CM

## 2019-07-10 DIAGNOSIS — J44.1 ACUTE EXACERBATION OF CHRONIC OBSTRUCTIVE PULMONARY DISEASE (COPD) (HCC): ICD-10-CM

## 2019-07-10 DIAGNOSIS — D69.6 THROMBOCYTOPENIA (HCC): ICD-10-CM

## 2019-07-10 LAB
BASOPHILS # BLD AUTO: 0.06 10*3/MM3 (ref 0–0.2)
BASOPHILS NFR BLD AUTO: 1.1 % (ref 0–1.5)
D-DIMER, QUANTITATIVE (MAD,POW, STR): 1052 NG/ML (FEU) (ref 0–470)
DEPRECATED RDW RBC AUTO: 43 FL (ref 37–54)
EOSINOPHIL # BLD AUTO: 0.14 10*3/MM3 (ref 0–0.4)
EOSINOPHIL NFR BLD AUTO: 2.6 % (ref 0.3–6.2)
ERYTHROCYTE [DISTWIDTH] IN BLOOD BY AUTOMATED COUNT: 13.8 % (ref 12.3–15.4)
FOLATE SERPL-MCNC: 18.6 NG/ML (ref 4.78–24.2)
HAV IGM SERPL QL IA: NORMAL
HBV CORE IGM SERPL QL IA: NORMAL
HBV SURFACE AG SERPL QL IA: NORMAL
HCT VFR BLD AUTO: 30.1 % (ref 37.5–51)
HCV AB SER DONR QL: NORMAL
HGB BLD-MCNC: 10.5 G/DL (ref 13–17.7)
IMM GRANULOCYTES # BLD AUTO: 0.02 10*3/MM3 (ref 0–0.05)
IMM GRANULOCYTES NFR BLD AUTO: 0.4 % (ref 0–0.5)
LYMPHOCYTES # BLD AUTO: 1.33 10*3/MM3 (ref 0.7–3.1)
LYMPHOCYTES NFR BLD AUTO: 24.7 % (ref 19.6–45.3)
MCH RBC QN AUTO: 29.9 PG (ref 26.6–33)
MCHC RBC AUTO-ENTMCNC: 34.9 G/DL (ref 31.5–35.7)
MCV RBC AUTO: 85.8 FL (ref 79–97)
MONOCYTES # BLD AUTO: 0.59 10*3/MM3 (ref 0.1–0.9)
MONOCYTES NFR BLD AUTO: 11 % (ref 5–12)
NEUTROPHILS # BLD AUTO: 3.24 10*3/MM3 (ref 1.7–7)
NEUTROPHILS NFR BLD AUTO: 60.2 % (ref 42.7–76)
NRBC BLD AUTO-RTO: 0 /100 WBC (ref 0–0.2)
PLATELET # BLD AUTO: 215 10*3/MM3 (ref 140–450)
PMV BLD AUTO: 9.7 FL (ref 6–12)
RBC # BLD AUTO: 3.51 10*6/MM3 (ref 4.14–5.8)
VIT B12 BLD-MCNC: 480 PG/ML (ref 211–946)
WBC NRBC COR # BLD: 5.38 10*3/MM3 (ref 3.4–10.8)

## 2019-07-10 PROCEDURE — 85379 FIBRIN DEGRADATION QUANT: CPT

## 2019-07-10 PROCEDURE — 99406 BEHAV CHNG SMOKING 3-10 MIN: CPT | Performed by: INTERNAL MEDICINE

## 2019-07-10 PROCEDURE — 82746 ASSAY OF FOLIC ACID SERUM: CPT

## 2019-07-10 PROCEDURE — G0463 HOSPITAL OUTPT CLINIC VISIT: HCPCS | Performed by: INTERNAL MEDICINE

## 2019-07-10 PROCEDURE — 99204 OFFICE O/P NEW MOD 45 MIN: CPT | Performed by: INTERNAL MEDICINE

## 2019-07-10 PROCEDURE — 80074 ACUTE HEPATITIS PANEL: CPT

## 2019-07-10 PROCEDURE — 85025 COMPLETE CBC W/AUTO DIFF WBC: CPT

## 2019-07-10 PROCEDURE — 82607 VITAMIN B-12: CPT

## 2019-07-10 RX ORDER — ALBUTEROL SULFATE 1.25 MG/3ML
1 SOLUTION RESPIRATORY (INHALATION) EVERY 6 HOURS PRN
Qty: 1 VIAL | Refills: 3 | Status: SHIPPED | OUTPATIENT
Start: 2019-07-10 | End: 2019-10-11 | Stop reason: SDUPTHER

## 2019-07-10 RX ORDER — ALBUTEROL SULFATE 90 UG/1
2 AEROSOL, METERED RESPIRATORY (INHALATION) EVERY 6 HOURS PRN
Qty: 1 INHALER | Refills: 3 | Status: SHIPPED | OUTPATIENT
Start: 2019-07-10 | End: 2019-10-11 | Stop reason: SDUPTHER

## 2019-07-10 RX ORDER — PREDNISONE 50 MG/1
50 TABLET ORAL DAILY
Qty: 5 TABLET | Refills: 0 | Status: SHIPPED | OUTPATIENT
Start: 2019-07-10 | End: 2019-07-15

## 2019-07-10 RX ORDER — METHOCARBAMOL 750 MG/1
TABLET, FILM COATED ORAL
COMMUNITY
Start: 2019-06-21 | End: 2019-10-11

## 2019-07-10 RX ORDER — PANTOPRAZOLE SODIUM 40 MG/1
40 TABLET, DELAYED RELEASE ORAL DAILY
Qty: 30 TABLET | Refills: 0 | Status: SHIPPED | OUTPATIENT
Start: 2019-07-10 | End: 2019-08-01 | Stop reason: SDUPTHER

## 2019-07-10 NOTE — PROGRESS NOTES
Khadar Dent  [id number]  1955      REASON FOR CONSULTATION:  Thrombocytopenia   Provide an opinion on any further workup or treatment                             REQUESTING PHYSICIAN:  Julio Cesar Luis MD     RECORDS OBTAINED:  Records of the patients history including those obtained from the referring provider were reviewed and summarized in detail.      History of Present Illness     This is a pleasant 64-year-old male who was seen in consultation at the request of Dr Harrell for evaluation of thrombocytopenia.  Patient unfortunately is a very poor and unreliable historian and most of the history was obtained by reviewing old medical charts.  Patient has past medical history of heavy tobacco abuse, alcohol abuse, COPD, chronic bronchitis, hypertension, osteoarthritis and medication noncompliance.  Patient had routine laboratory testing performed on April 8, 2019 which showed platelet count of 419,000.  His hemoglobin and white blood cell count were within normal limits.  Unfortunately, I do not have any other old CBC available to compare.  Patient cannot recall whether he was ever told that he had thrombocytopenia.  He had present has multiple complaints from his COPD however denies any abnormal bleeding or bruising.  He denies any prior history of hepatitis infection.  However, is not sure whether he was tested for hepatitis infection.  He does have multiple tattoos throughout his body.  I have been asked to assist with evaluation/management of his thrombocytopenia.        Active Ambulatory Problems     Diagnosis Date Noted   • Annual physical exam 03/07/2019   • General medical examination 03/07/2019   • Encounter for tobacco use cessation counseling 03/07/2019   • Encounter for screening for cardiovascular disorders 03/07/2019   • Encounter for screening for endocrine disorder 03/07/2019   • Chronic obstructive pulmonary disease (CMS/HCC) 03/07/2019   • Tobacco abuse counseling 03/07/2019   • Tobacco user  "03/07/2019   • Dyspnea 03/07/2019   • Back pain 03/07/2019   • Elevated blood pressure reading 03/07/2019   • Alcohol abuse counseling and surveillance 03/07/2019   • Alcohol abuse 03/07/2019   • Tachycardia 03/07/2019   • Chest pain 04/06/2019   • Essential hypertension 04/08/2019   • Thrombocytopenia (CMS/HCC) 04/20/2019   • Hyperlipidemia 04/20/2019   • Steatohepatitis, alcoholic 04/20/2019   • Enteritis 05/08/2019   • Nausea and vomiting 05/08/2019   • COPD exacerbation (CMS/HCC) 05/22/2019   • Acute on chronic respiratory failure with hypoxia (CMS/HCC) 05/22/2019   • At high risk for falls 05/22/2019   • Gait instability 05/22/2019   • DDD (degenerative disc disease), lumbar 06/18/2019   • Chronic low back pain with sciatica 06/18/2019     Resolved Ambulatory Problems     Diagnosis Date Noted   • No Resolved Ambulatory Problems     Past Medical History:   Diagnosis Date   • Arthritis    • Bronchitis    • Cataracts, bilateral    • COPD (chronic obstructive pulmonary disease) (CMS/HCC)    • Degeneration of lumbar intervertebral disc    • Fatty liver    • Hyperlipemia    • Hypertension        Past Surgical History:   Procedure Laterality Date   • BACK SURGERY     • CATARACT EXTRACTION     • COLONOSCOPY      pt states \"been a while back\" polyps found   • SHOULDER SURGERY         Social History     Socioeconomic History   • Marital status:      Spouse name: Not on file   • Number of children: Not on file   • Years of education: Not on file   • Highest education level: Not on file   Tobacco Use   • Smoking status: Current Every Day Smoker   • Smokeless tobacco: Never Used   Substance and Sexual Activity   • Alcohol use: Yes     Frequency: 4 or more times a week     Drinks per session: 1 or 2       Family History   Problem Relation Age of Onset   • Heart disease Mother    • Stroke Mother    • Stroke Father    • Heart disease Father    • Heart disease Brother          Current Outpatient Medications on File " Prior to Visit   Medication Sig Dispense Refill   • albuterol (ACCUNEB) 1.25 MG/3ML nebulizer solution Take 3 mL by nebulization Every 6 (Six) Hours As Needed for Wheezing. 1 vial 3   • albuterol sulfate  (90 Base) MCG/ACT inhaler Inhale 2 puffs Every 6 (Six) Hours As Needed for Wheezing. 1 inhaler 3   • atorvastatin (LIPITOR) 20 MG tablet Take 1 tablet by mouth Daily. 30 tablet 3   • cyclobenzaprine (FLEXERIL) 5 MG tablet Take 1 tablet by mouth 3 (Three) Times a Day As Needed for Muscle Spasms. 90 tablet 2   • lisinopril (PRINIVIL,ZESTRIL) 40 MG tablet Take 1 tablet by mouth Daily. 30 tablet 3   • metoprolol succinate XL (TOPROL XL) 50 MG 24 hr tablet Take 1 tablet by mouth Daily. 30 tablet 3   • mometasone-formoterol (DULERA) 200-5 MCG/ACT inhaler Inhale 2 puffs 2 (Two) Times a Day. 1 inhaler 0   • nicotine (NICODERM CQ) 21 MG/24HR patch Place 1 patch on the skin as directed by provider Daily. 30 patch 3   • ondansetron ODT (ZOFRAN ODT) 8 MG disintegrating tablet Take 1 tablet by mouth Every 8 (Eight) Hours As Needed for Nausea or Vomiting. 90 tablet 3   • pantoprazole (PROTONIX) 40 MG EC tablet TAKE 1 TABLET DAILY. 30 tablet 0   • sodium chloride 1 g tablet Take 1 g by mouth Daily.       Current Facility-Administered Medications on File Prior to Visit   Medication Dose Route Frequency Provider Last Rate Last Dose   • methylPREDNISolone sodium succinate (SOLU-Medrol) injection 125 mg  125 mg Intravenous Q6H Toby Harrell MD   125 mg at 05/22/19 1020        ALLERGIES:    Allergies   Allergen Reactions   • Aspirin Hives   • Codeine Hives   • Morphine Hives   • Penicillins Hives        Social History     Socioeconomic History   • Marital status:      Spouse name: Not on file   • Number of children: Not on file   • Years of education: Not on file   • Highest education level: Not on file   Tobacco Use   • Smoking status: Current Every Day Smoker   • Smokeless tobacco: Never Used   Substance and Sexual  Activity   • Alcohol use: Yes        Family History   Problem Relation Age of Onset   • Heart disease Mother    • Stroke Mother    • Stroke Father    • Heart disease Father    • Heart disease Brother         Review of Systems       CONSTITUTIONAL: fatigue + weakness + weight loss + No fever, chills.  HEENT: Eyes: No visual loss, blurred vision, double vision or yellow sclerae. Ears, Nose, Throat: No hearing loss, sneezing, congestion, runny nose or sore throat.  SKIN: No rash or itching.  CARDIOVASCULAR: chest pain + chronic . No palpitations or edema.  RESPIRATORY:cough + SOB +   GASTROINTESTINAL: anorexia + abdominal pain +. No nausea or emesis.   GENITOURINARY: Negative for urgency, frequency or dysuria.   NEUROLOGICAL: No headache, dizziness, syncope, paralysis, ataxia, numbness or tingling in the extremities. No change in bowel or bladder control.  MUSCULOSKELETAL: Chronic joint pain +   HEMATOLOGIC: No anemia, bleeding or bruising.  LYMPHATICS: No enlarged nodes. No history of splenectomy.  PSYCHIATRIC: No history of depression or anxiety.  ENDOCRINOLOGIC: No reports of sweating, cold or heat intolerance. No polyuria or polydipsia.  ALLERGIES: No history of asthma, hives, eczema or rhinitis.      Objective     Vitals:    07/10/19 1324   BP: 128/65   Pulse: 99   Resp: 20   Temp: 99 °F (37.2 °C)   SpO2: 95%         Physical Exam      GENERAL: Alert, awake, oriented.  Chronically ill looking male, disheveled. Poor hygiene. Appears much older than his actual age. Vitals as above.   HEAD: Normocephalic, atraumatic.   EYES: PERRL, EOMI. F vision is grossly intact.  NECK: Supple, no adenopathy or thyromegaly.   THROAT: Normal oral cavity and pharynx. No inflammation, swelling, exudate, or lesions.  CARDIAC: Normal S1 and S2. No S3, S4 or murmurs. Rhythm is regular.  Extremities are warm and well perfused.   LUNGS: Decreased air entry bilaterally, expiratory wheezes with prolongation of expiratory phase.  ABDOMEN:  Positive bowel sounds. Soft, nondistended, nontender. No guarding or rebound. No masses.  BACK:  No bony tenderness.   EXTREMITIES: Diffuse osteoarthritis involving multiple joints +  Peripheral pulses intact. No varicosities.  SKIN: No rash or bruising. Multiple tattoos +   NEUROLOGICAL: Grossly non-focal exam. No focal weakness. Gait: Walks with cane.   PSYCH: Mood and affect normal. No hallucination or suicidal thoughts.   LYMPHATIC: No cervical, supraclavicular or axillary adenopathy.       RECENT LABS: Independently reviewed and summarized  Hematology WBC   Date Value Ref Range Status   04/08/2019 4.44 3.40 - 10.80 10*3/mm3 Final     RBC   Date Value Ref Range Status   04/08/2019 4.44 4.14 - 5.80 10*6/mm3 Final     Hemoglobin   Date Value Ref Range Status   04/08/2019 14.0 13.0 - 17.7 g/dL Final     Hematocrit   Date Value Ref Range Status   04/08/2019 41.5 37.5 - 51.0 % Final     Platelets   Date Value Ref Range Status   04/08/2019 119 (L) 140 - 450 10*3/mm3 Final          Imaging (independently reviewed and summarized):   CT abdomen pelvis without contrast on March 10, 2019 showed fatty liver disease.  No hepato-splenomegaly or lymphadenopathy in abdomen.    I have reviewed old records and summarized them in HPI as well as assessment and plan section of this note.     We will request old medical records from his PCP's office.     Diagnosis:   (1) Thrombocytopenia  (2) Steato-hepatitis, alcoholic   (3) Acute COPD exacerbation   (4) Alcohol abuse  (5) Encounter for tobacco use cessation counseling   (6) GERD    All are new diagnosis/problems for me.     Assessment/Plan       (1) Thrombocytopenia: Patient with multiple issues including heavy alcohol abuse, tobacco abuse, COPD, osteoarthritis, chest pain and medication noncompliance.  He had routine laboratory testing performed in April 2019 which showed platelet count of 119,000.  I do not have any old medical records available to compare.  However, I will  request old CBC records to see whether this is acute or chronic finding.  He denies any abnormal bleeding or bruising.  As stated earlier patient does drink heavily and does have fatty liver disease likely from alcoholic steatohepatitis.  It is quite possible that his thrombocytopenia is secondary to alcohol use and liver disease.  However, we will check peripheral smear, vitamin B12, folic acid, hepatitis panel to evaluate his thrombocytopenia further.      (2) Steato-hepatitis, alcoholic: Counseled extensively about cessation of alcohol today.    (3) Acute COPD exacerbation: Patient with severe COPD and ongoing smoking.  She reports severe shortness of breath in our clinic with clinical exam consistent with acute COPD exacerbation.  Offered to him to get him to emergency room or hospital which he refused.  He apparently has ran out of his albuterol.  I have sent new prescriptions of albuterol inhaler and nebulizer to his pharmacy.  I also sent prescription of Advair to his pharmacy along with 5-day course of prednisone 50 mg p.o.       (4) Alcohol abuse: Patient reports drinking 6 packs of beer along with hard liquor every day.  Counseled extensively about harmful effects of alcohol.  He says that he is not interested in quitting at this point.  Rehab and social service consults were offered to the patient's in case if he changes his mind.    (5) Encounter for tobacco use cessation counseling: I had 5-7 minutes discussion with the patient about smoking cessation. Problems with continued smoking including respiratory, cardiovascular and oncological problems were discussion. Advice on smoking cessation was reiterated including methods of quitting and different medications and support system available for smoking cessation was discussed.     (6) GERD: Prescription of Protonix was sent to his pharmacy.    Thank you for involving me in Mr eddy's care.     Please call us if any questions/concerns.     Zach Garcia  MD   Hematology Oncology

## 2019-07-11 LAB
CYTOLOGIST CVX/VAG CYTO: NORMAL
PATH INTERP BLD-IMP: NORMAL

## 2019-07-12 ENCOUNTER — DOCUMENTATION (OUTPATIENT)
Dept: NUTRITION | Facility: HOSPITAL | Age: 64
End: 2019-07-12

## 2019-07-12 NOTE — PROGRESS NOTES
Adult Outpatient Nutrition  Assessment    Patient Name:  Khadar Dent  YOB: 1955  MRN: [id number]    Screen Date:  7/12/2019    Comments: Chart review revealed 64WM coming to [facility] due to thrombocytopenia. COPD. Alcohol/Tobacco use.  Ht 65 in. Wt 144 lb (stable for past 4 mon.). BMI 24. Alb 4.8. No indication of 1) dietary restrictions  2) immediate nutrition related problems. RDN available as needed in future.                        Electronically signed by:  Elysia Zamudio RD  07/12/19 1:14 PM

## 2019-07-17 NOTE — PROGRESS NOTES
Subjective:  Khadar Dent is a 64 y.o. male who presents for       Patient Active Problem List   Diagnosis   • Annual physical exam   • General medical examination   • Encounter for tobacco use cessation counseling   • Encounter for screening for cardiovascular disorders   • Encounter for screening for endocrine disorder   • Chronic obstructive pulmonary disease (CMS/HCC)   • Tobacco abuse counseling   • Tobacco user   • Dyspnea   • Back pain   • Elevated blood pressure reading   • Alcohol abuse counseling and surveillance   • Alcohol abuse   • Tachycardia   • Chest pain   • Essential hypertension   • Thrombocytopenia (CMS/HCC)   • Hyperlipidemia   • Steatohepatitis, alcoholic   • Enteritis   • Nausea and vomiting   • Acute exacerbation of chronic obstructive pulmonary disease (COPD) (CMS/HCC)   • Acute on chronic respiratory failure with hypoxia (CMS/HCC)   • At high risk for falls   • Gait instability   • DDD (degenerative disc disease), lumbar   • Chronic low back pain with sciatica   • Normocytic anemia   • Elevated d-dimer   • Alcohol withdrawal syndrome with complication (CMS/HCC)           Current Outpatient Medications:   •  albuterol (ACCUNEB) 1.25 MG/3ML nebulizer solution, Take 3 mL by nebulization Every 6 (Six) Hours As Needed for Wheezing., Disp: 1 vial, Rfl: 3  •  albuterol sulfate  (90 Base) MCG/ACT inhaler, Inhale 2 puffs Every 6 (Six) Hours As Needed for Wheezing., Disp: 1 inhaler, Rfl: 3  •  atorvastatin (LIPITOR) 20 MG tablet, Take 1 tablet by mouth Daily., Disp: 30 tablet, Rfl: 3  •  cyclobenzaprine (FLEXERIL) 5 MG tablet, Take 1 tablet by mouth 3 (Three) Times a Day As Needed for Muscle Spasms., Disp: 90 tablet, Rfl: 2  •  INCRUSE ELLIPTA 62.5 MCG/INH aerosol powder , , Disp: , Rfl:   •  lisinopril (PRINIVIL,ZESTRIL) 40 MG tablet, Take 1 tablet by mouth Daily., Disp: 30 tablet, Rfl: 3  •  methocarbamol (ROBAXIN) 750 MG tablet, , Disp: , Rfl:   •  metoprolol succinate XL (TOPROL XL)  50 MG 24 hr tablet, Take 1 tablet by mouth Daily., Disp: 30 tablet, Rfl: 3  •  nicotine (NICODERM CQ) 21 MG/24HR patch, Place 1 patch on the skin as directed by provider Daily., Disp: 30 patch, Rfl: 3  •  ondansetron ODT (ZOFRAN ODT) 8 MG disintegrating tablet, Take 1 tablet by mouth Every 8 (Eight) Hours As Needed for Nausea or Vomiting., Disp: 90 tablet, Rfl: 3  •  pantoprazole (PROTONIX) 40 MG EC tablet, Take 1 tablet by mouth Daily., Disp: 30 tablet, Rfl: 0  •  sodium chloride 1 g tablet, Take 1 g by mouth Daily., Disp: , Rfl:   •  chlordiazePOXIDE (LIBRIUM) 25 MG capsule, Take 4 pills daily for 1 week 3 pills daily for 2nd week 2 pills daily for 3rd week and 1 pill daily for 4th week, Disp: 70 capsule, Rfl: 0  •  fluticasone-salmeterol (WIXELA INHUB) 500-50 MCG/DOSE DISKUS, Inhale 1 puff 2 (Two) Times a Day., Disp: 60 each, Rfl: 3    Current Facility-Administered Medications:   •  methylPREDNISolone sodium succinate (SOLU-Medrol) injection 125 mg, 125 mg, Intravenous, Q6H, Toby Harrell MD, 125 mg at 05/22/19 1020        5/29/19 pt is here for recheck on COPD exacerbation. On last visit he was given solumedrol 125 mg IM injection along with prednisone tapering dose. He was also given dulera to help with breathing. He was recommended to go to [facility] ER but pt refused and signed AMA.  He was also set up with appt with Pulmonologist and has appt with PUlmonologist soon.  His breathing is somewhat better. He continues to smoke and is taking his prednisone tapering dose along with dulera 200/5 mg      6/19/19 pt is here for echeck and followup. Pt has upcoming appt with Hematologist soon for thromboctyopenia. He also has COPD and is on Dulera. He conitnues to smoke 1/2 ppd. He has an upcoming appt with Dr. Spain tomorrow for his COPD. His BP is elevated today despite taking lisinopril 40 mg. No chest pain. Oxygen saturation stable today.       7/19/19 pt is 63 yo male with history of severe COPD, HTN, HLP,  history of enteririts. Gait instability. Chronic lower back pain,, moderate to severe loss of L5-S1,  DDD of lumbar spine, Tobacco use,heavy allcohol use, fatty liver disease.  He did see Hematologist on 7/10/19 Dr. Serrano for history of thrombocytopenia.  He did have labwork recently that showed D-dimer elveated >1000. Vitamin B12 was normal hepatitis panel was negative. foatle was normal.  CBC showed hemoglobin at 10.5 with platelets at 215.  Peripheral blood smear showed slight normocytic normochromic anemia without schistocytes.  Tear drop cells. He is liking Home Health. He has chest pain and today it is 2/10        Shortness of Breath   This is a chronic problem. The current episode started more than 1 year ago. The problem occurs constantly. Associated symptoms include abdominal pain and chest pain. Pertinent negatives include no claudication, coryza, ear pain, fever, headaches, hemoptysis, leg pain, leg swelling, neck pain, orthopnea, PND, rash, rhinorrhea, sore throat, sputum production, syncope, vomiting or wheezing. Nothing aggravates the symptoms. Risk factors include smoking. He has tried beta agonist inhalers, oral steroids and steroid inhalers for the symptoms. The treatment provided moderate relief. His past medical history is significant for allergies, aspirin allergies, chronic lung disease and COPD. There is no history of asthma, bronchiolitis, CAD, DVT, a heart failure, PE, pneumonia or a recent surgery.    Abdominal Pain   This is a chronic problem. The current episode started more than 1 year ago. The onset quality is gradual. The problem has been gradually worsening. The pain is at a severity of 4/10. The pain is moderate. The quality of the pain is aching. The abdominal pain does not radiate. Associated symptoms include headaches. Pertinent negatives include  no anorexia, arthralgias, belching, constipation, diarrhea, dysuria, fever, flatus, hematochezia, hematuria, melena, myalgias, nausea, vomiting or weight loss. The pain is aggravated by bowel movement and deep breathing. The pain is relieved by nothing. He has tried nothing for the symptoms. Prior diagnostic workup includes CT scan and upper endoscopy. His past medical history is significant for GERD. There is no history of abdominal surgery, colon cancer, Crohn's disease, gallstones, pancreatitis, PUD or ulcerative colitis.    Chest Pain    The current episode started more than 1 year ago. The onset quality is gradual. The problem occurs intermittently. The problem has been waxing and waning. The pain is present in the substernal region and lateral region. The pain is at a severity of 5/10. The pain is moderate. The quality of the pain is described as numbness, pressure and tightness. Associated symptoms include back pain, dizziness, exertional chest pressure, headaches, numbness, palpitations, shortness of breath and weakness. Pertinent negatives include no abdominal pain, claudication, cough, diaphoresis, fever, hemoptysis, irregular heartbeat, leg pain, lower extremity edema, malaise/fatigue, nausea, near-syncope, orthopnea, PND, sputum production, syncope or vomiting. The pain is aggravated by breathing, coughing, deep breathing, movement, walking and exertion. The treatment provided no relief. Risk factors include alcohol intake, being elderly, lack of exercise, smoking/tobacco exposure, sedentary lifestyle and stress.   His past medical history is significant for hypertension.   Pertinent negatives for past medical history include no aneurysm, no anxiety/panic attacks, no aortic aneurysm, no aortic dissection, no arrhythmia, no bicuspid aortic valve, no CAD, no cancer, no congenital heart disease, no connective tissue disease, no COPD, no CHF, no diabetes, no DVT, no hyperhomocysteinemia, no  hyperlipidemia, no Kawasaki disease, no Marfan's syndrome, no MI, no mitral valve prolapse, no pacemaker, no PE, no PVD, no recent injury, no rheumatic fever, no seizures, no sickle cell disease, no sleep apnea, no spontaneous pneumothorax, no stimulant use, no strokes, no thyroid problem, no TIA, Shin syndrome and no valve disorder.   Hypertension   This is a chronic problem. The current episode started more than 1 year ago. The problem has been waxing and waning since onset. The problem is uncontrolled. Associated symptoms include chest pain, headaches, neck pain, palpitations and shortness of breath. Pertinent negatives include no anxiety, malaise/fatigue, orthopnea, peripheral edema, PND or sweats. Risk factors for coronary artery disease include male gender, sedentary lifestyle, smoking/tobacco exposure and dyslipidemia. Past treatments include ACE inhibitors and diuretics. Current antihypertension treatment includes ACE inhibitors and diuretics. The current treatment provides no improvement. There are no compliance problems.  There is no history of angina, kidney disease, CAD/MI, CVA, heart failure, left ventricular hypertrophy, PVD or retinopathy. There is no history of chronic renal disease, coarctation of the aorta, hyperaldosteronism, hypercortisolism, hyperparathyroidism, a hypertension causing med, pheochromocytoma, renovascular disease, sleep apnea or a thyroid problem.   Back Pain   This is a chronic problem. The current episode started more than 1 year ago. The problem occurs constantly. The problem is unchanged. The pain is present in the lumbar spine and sacro-iliac. The quality of the pain is described as aching. Stiffness is present all day. Associated symptoms include chest pain, headaches, numbness and weakness. Pertinent negatives include no abdominal pain, bladder incontinence, bowel incontinence, dysuria, fever, leg pain, paresis, paresthesias, pelvic pain, perianal numbness, tingling or  weight loss. The treatment provided no relief.   Alcohol Problem   This is a chronic problem. The current episode started more than 1 year ago. The problem occurs constantly. The problem has been unchanged. Associated symptoms include arthralgias, chest pain, fatigue, headaches, joint swelling, myalgias, neck pain, numbness and weakness. Pertinent negatives include no abdominal pain, anorexia, change in bowel habit, chills, congestion, coughing, diaphoresis, fever, nausea, rash, sore throat, swollen glands, urinary symptoms, vertigo, visual change or vomiting. Nothing aggravates the symptoms. He has tried nothing for the symptoms. The treatment provided no relief.    COPD   This is a new problem. The current episode started more than 1 year ago. The problem occurs constantly. Associated symptoms include arthralgias, chest pain, fatigue, headaches and weakness. Pertinent negatives include no abdominal pain, anorexia, change in bowel habit, chills, congestion, coughing, diaphoresis, fever, joint swelling, myalgias, nausea, neck pain or numbness    Review of Systems  Review of Systems   Constitutional: Positive for activity change and fatigue. Negative for appetite change, chills, diaphoresis and fever.   HENT: Negative for congestion, ear pain, postnasal drip, rhinorrhea, sinus pressure, sinus pain, sneezing, sore throat, trouble swallowing and voice change.    Respiratory: Positive for chest tightness and shortness of breath. Negative for cough, hemoptysis, sputum production, choking, wheezing and stridor.    Cardiovascular: Positive for chest pain. Negative for orthopnea, claudication, leg swelling, syncope and PND.   Gastrointestinal: Positive for abdominal pain. Negative for diarrhea, nausea and vomiting.   Musculoskeletal: Positive for arthralgias, back pain, gait problem, joint swelling and myalgias. Negative for neck pain.   Skin: Negative for rash.   Neurological: Positive for tremors, weakness and numbness.  "Negative for headaches.       Patient Active Problem List   Diagnosis   • Annual physical exam   • General medical examination   • Encounter for tobacco use cessation counseling   • Encounter for screening for cardiovascular disorders   • Encounter for screening for endocrine disorder   • Chronic obstructive pulmonary disease (CMS/HCC)   • Tobacco abuse counseling   • Tobacco user   • Dyspnea   • Back pain   • Elevated blood pressure reading   • Alcohol abuse counseling and surveillance   • Alcohol abuse   • Tachycardia   • Chest pain   • Essential hypertension   • Thrombocytopenia (CMS/HCC)   • Hyperlipidemia   • Steatohepatitis, alcoholic   • Enteritis   • Nausea and vomiting   • Acute exacerbation of chronic obstructive pulmonary disease (COPD) (CMS/HCC)   • Acute on chronic respiratory failure with hypoxia (CMS/HCC)   • At high risk for falls   • Gait instability   • DDD (degenerative disc disease), lumbar   • Chronic low back pain with sciatica   • Normocytic anemia   • Elevated d-dimer   • Alcohol withdrawal syndrome with complication (CMS/HCC)     Past Surgical History:   Procedure Laterality Date   • BACK SURGERY     • CATARACT EXTRACTION     • COLONOSCOPY      pt states \"been a while back\" polyps found   • SHOULDER SURGERY       Social History     Socioeconomic History   • Marital status:      Spouse name: Not on file   • Number of children: Not on file   • Years of education: Not on file   • Highest education level: Not on file   Tobacco Use   • Smoking status: Current Every Day Smoker   • Smokeless tobacco: Never Used   Substance and Sexual Activity   • Alcohol use: Yes     Frequency: 4 or more times a week     Drinks per session: 1 or 2     Family History   Problem Relation Age of Onset   • Heart disease Mother    • Stroke Mother    • Stroke Father    • Heart disease Father    • Heart disease Brother      Lab on 07/10/2019   Component Date Value Ref Range Status   • Folate 07/10/2019 18.60  4.78 " - 24.20 ng/mL Final   • Performed by: 07/10/2019 Wes Peralta MD   Final   • Pathologist Interpretation 07/10/2019    Final                    Value:Slight normocytic normochromic anemia without schistocytes, tear drop cells, inclusions, microcytes, normoblasts or rouleaux.  Platelet count 215 (normal, CBC 7/10/2019).  No platelet satellitism.  Rare megathrombocytes present.  White cell count 5.38 (normal, CBC 7/10/2019).  Left shift or myeloblasts not identified.   • Hepatitis B Surface Ag 07/10/2019 Non-Reactive  Non-Reactive Final   • Hep A IgM 07/10/2019 Non-Reactive  Non-Reactive Final   • Hep B C IgM 07/10/2019 Non-Reactive  Non-Reactive Final   • Hepatitis C Ab 07/10/2019 Non-Reactive  Non-Reactive Final   • Vitamin B-12 07/10/2019 480  211 - 946 pg/mL Final   • D-Dimer, Quantitative 07/10/2019 1,052* 0 - 470 ng/mL (FEU) Final   • WBC 07/10/2019 5.38  3.40 - 10.80 10*3/mm3 Final   • RBC 07/10/2019 3.51* 4.14 - 5.80 10*6/mm3 Final   • Hemoglobin 07/10/2019 10.5* 13.0 - 17.7 g/dL Final   • Hematocrit 07/10/2019 30.1* 37.5 - 51.0 % Final   • MCV 07/10/2019 85.8  79.0 - 97.0 fL Final   • MCH 07/10/2019 29.9  26.6 - 33.0 pg Final   • MCHC 07/10/2019 34.9  31.5 - 35.7 g/dL Final   • RDW 07/10/2019 13.8  12.3 - 15.4 % Final   • RDW-SD 07/10/2019 43.0  37.0 - 54.0 fl Final   • MPV 07/10/2019 9.7  6.0 - 12.0 fL Final   • Platelets 07/10/2019 215  140 - 450 10*3/mm3 Final   • Neutrophil % 07/10/2019 60.2  42.7 - 76.0 % Final   • Lymphocyte % 07/10/2019 24.7  19.6 - 45.3 % Final   • Monocyte % 07/10/2019 11.0  5.0 - 12.0 % Final   • Eosinophil % 07/10/2019 2.6  0.3 - 6.2 % Final   • Basophil % 07/10/2019 1.1  0.0 - 1.5 % Final   • Immature Grans % 07/10/2019 0.4  0.0 - 0.5 % Final   • Neutrophils, Absolute 07/10/2019 3.24  1.70 - 7.00 10*3/mm3 Final   • Lymphocytes, Absolute 07/10/2019 1.33  0.70 - 3.10 10*3/mm3 Final   • Monocytes, Absolute 07/10/2019 0.59  0.10 - 0.90 10*3/mm3 Final   • Eosinophils, Absolute  07/10/2019 0.14  0.00 - 0.40 10*3/mm3 Final   • Basophils, Absolute 07/10/2019 0.06  0.00 - 0.20 10*3/mm3 Final   • Immature Grans, Absolute 07/10/2019 0.02  0.00 - 0.05 10*3/mm3 Final   • nRBC 07/10/2019 0.0  0.0 - 0.2 /100 WBC Final   Lab on 04/08/2019   Component Date Value Ref Range Status   • WBC 04/08/2019 4.44  3.40 - 10.80 10*3/mm3 Final   • RBC 04/08/2019 4.44  4.14 - 5.80 10*6/mm3 Final   • Hemoglobin 04/08/2019 14.0  13.0 - 17.7 g/dL Final   • Hematocrit 04/08/2019 41.5  37.5 - 51.0 % Final   • MCV 04/08/2019 93.5  79.0 - 97.0 fL Final   • MCH 04/08/2019 31.5  26.6 - 33.0 pg Final   • MCHC 04/08/2019 33.7  31.5 - 35.7 g/dL Final   • RDW 04/08/2019 13.6  12.3 - 15.4 % Final   • RDW-SD 04/08/2019 46.5  37.0 - 54.0 fl Final   • MPV 04/08/2019 12.2* 6.0 - 12.0 fL Final   • Platelets 04/08/2019 119* 140 - 450 10*3/mm3 Final   • Neutrophil % 04/08/2019 61.7  42.7 - 76.0 % Final   • Lymphocyte % 04/08/2019 23.9  19.6 - 45.3 % Final   • Monocyte % 04/08/2019 9.9  5.0 - 12.0 % Final   • Eosinophil % 04/08/2019 2.9  0.3 - 6.2 % Final   • Basophil % 04/08/2019 1.4  0.0 - 1.5 % Final   • Immature Grans % 04/08/2019 0.2  0.0 - 0.5 % Final   • Neutrophils, Absolute 04/08/2019 2.74  1.40 - 7.00 10*3/mm3 Final   • Lymphocytes, Absolute 04/08/2019 1.06  0.70 - 3.10 10*3/mm3 Final   • Monocytes, Absolute 04/08/2019 0.44  0.10 - 0.90 10*3/mm3 Final   • Eosinophils, Absolute 04/08/2019 0.13  0.00 - 0.40 10*3/mm3 Final   • Basophils, Absolute 04/08/2019 0.06  0.00 - 0.20 10*3/mm3 Final   • Immature Grans, Absolute 04/08/2019 0.01  0.00 - 0.05 10*3/mm3 Final   • nRBC 04/08/2019 0.0  0.0 - 0.0 /100 WBC Final   • Glucose 04/08/2019 71  65 - 99 mg/dL Final   • BUN 04/08/2019 10  8 - 23 mg/dL Final   • Creatinine 04/08/2019 0.74* 0.76 - 1.27 mg/dL Final   • Sodium 04/08/2019 133* 136 - 145 mmol/L Final   • Potassium 04/08/2019 4.2  3.5 - 5.2 mmol/L Final   • Chloride 04/08/2019 90* 98 - 107 mmol/L Final   • CO2 04/08/2019 23.3   22.0 - 29.0 mmol/L Final   • Calcium 04/08/2019 9.1  8.6 - 10.5 mg/dL Final   • Total Protein 04/08/2019 8.6* 6.0 - 8.5 g/dL Final   • Albumin 04/08/2019 4.80  3.50 - 5.20 g/dL Final   • ALT (SGPT) 04/08/2019 18  1 - 41 U/L Final   • AST (SGOT) 04/08/2019 40  1 - 40 U/L Final   • Alkaline Phosphatase 04/08/2019 63  39 - 117 U/L Final   • Total Bilirubin 04/08/2019 0.4  0.2 - 1.2 mg/dL Final   • eGFR Non African Amer 04/08/2019 107  >60 mL/min/1.73 Final   • Globulin 04/08/2019 3.8  gm/dL Final   • A/G Ratio 04/08/2019 1.3  g/dL Final   • BUN/Creatinine Ratio 04/08/2019 13.5  7.0 - 25.0 Final   • Anion Gap 04/08/2019 19.7  mmol/L Final   • Hemoglobin A1C 04/08/2019 4.78* 4.80 - 5.60 % Final   • Total Cholesterol 04/08/2019 165  0 - 200 mg/dL Final   • Triglycerides 04/08/2019 45  0 - 150 mg/dL Final   • HDL Cholesterol 04/08/2019 115* 40 - 60 mg/dL Final   • LDL Cholesterol  04/08/2019 41  0 - 100 mg/dL Final   • VLDL Cholesterol 04/08/2019 9  5 - 40 mg/dL Final   • LDL/HDL Ratio 04/08/2019 0.36   Final   • TSH 04/08/2019 1.250  0.270 - 4.200 mIU/mL Final   • Free T4 04/08/2019 1.28  0.93 - 1.70 ng/dL Final   • T3, Free 04/08/2019 3.69  2.00 - 4.40 pg/mL Final   • 25 Hydroxy, Vitamin D 04/08/2019 28.3* 30.0 - 100.0 ng/ml Final      XR Spine Lumbar AP & Lateral  Narrative: PROCEDURE: Lumbar spine 3 views    REASON FOR EXAM: back pain, M54.9 Dorsalgia, unspecified    FINDINGS: . Lumbar spine vertebral body heights and alignment are  normal. There is no evidence of fracture or dislocation. Moderate  to severe loss of L5-S1 intervertebral disc space height with  associated anterior osteophytosis consistent with degenerative  disc disease. Otherwise intervertebral disc spaces are intact.  Impression: 1.Moderate to severe loss of L5-S1 intervertebral disc space  height with associated anterior osteophytosis consistent with  degenerative disc disease.   2. No acute lumbar spine abnormality..    Electronically signed by:   "Manjeet Hughes MD  5/30/2019 2:31 PM CDT  Workstation: [id number]  XR Chest PA & Lateral  Narrative: Chest PA and lateral     CLINICAL INDICATION: Shortness of breath    COMPARISON: None    FINDINGS: Cardiac silhouette is normal in size. Pulmonary  vascularity is unremarkable.   Linear fibrotic changes, scarring right apex.  No focal infiltrate or consolidation.  No pleural effusion.  No  pneumothorax.  Flattening both diaphragms and increase in the retrosternal  airspace indicating air trapping, COPD.  Impression: CONCLUSION: Flattening both diaphragms and increase in the  retrosternal airspace indicating air trapping, COPD.    Electronically signed by:  Madi Guardado MD  5/30/2019 2:12 PM CDT  Workstation: [id number]    @[email or web address]@    There is no immunization history on file for this patient.    The following portions of the patient's history were reviewed and updated as appropriate: allergies, current medications, past family history, past medical history, past social history, past surgical history and problem list.        Physical Exam  /80 (BP Location: Left arm, Patient Position: Sitting, Cuff Size: Adult)   Pulse 77   Temp 97.5 °F (36.4 °C) (Oral)   Ht 165.1 cm (65\")   Wt 67.1 kg (148 lb)   SpO2 99%   BMI 24.63 kg/m²     Physical Exam   Constitutional: He is oriented to person, place, and time. He appears well-developed and well-nourished.   HENT:   Head: Normocephalic and atraumatic.   Right Ear: External ear normal.   Eyes: Conjunctivae and EOM are normal. Pupils are equal, round, and reactive to light.   Neck: Normal range of motion. Neck supple.   Cardiovascular: Normal rate, regular rhythm and normal heart sounds.   No murmur heard.  Pulmonary/Chest: Effort normal and breath sounds normal. No respiratory distress.   Decreased breath sounds in all lung fields    Abdominal: Soft. Bowel sounds are normal. He exhibits no distension. There is no tenderness.   Musculoskeletal: He exhibits tenderness. He " exhibits no edema or deformity.        Lumbar back: He exhibits decreased range of motion, tenderness, bony tenderness, pain and spasm.   Neurological: He is alert and oriented to person, place, and time. No cranial nerve deficit.   Skin: Skin is warm. No rash noted. He is not diaphoretic. No erythema. No pallor.   Psychiatric: He has a normal mood and affect. His behavior is normal.   Nursing note and vitals reviewed.      Assessment/Plan    Diagnosis Plan   1. Elevated d-dimer  CT Angiogram Chest With Contrast    Duplex Venous Upper Extremity - Bilateral CAR   2. Tobacco abuse counseling     3. Tobacco user     4. Steatohepatitis, alcoholic     5. Essential hypertension     6. Gait instability     7. DDD (degenerative disc disease), lumbar     8. Chronic obstructive pulmonary disease, unspecified COPD type (CMS/HCC)  CT Angiogram Chest With Contrast   9. Chronic low back pain with sciatica, sciatica laterality unspecified, unspecified back pain laterality     10. At high risk for falls     11. Alcohol abuse     12. Alcohol abuse counseling and surveillance     13. Hyperlipidemia, unspecified hyperlipidemia type     14. Normocytic anemia     15. Dyspnea on exertion  CT Angiogram Chest With Contrast    Duplex Venous Upper Extremity - Bilateral CAR   16. Chest pain on breathing     17. Alcohol withdrawal syndrome with complication (CMS/HCC)          -anemia/thrombocytopenia- pt has seen Dr. Serrano.  Had labwork done. Last plaetlet count normal   -counseled pt to quit smoking >5 minutes. Pt has cut down. He is using nicotine patches  -D-dimer elevated. Pt's oxygenation is at 99%.   Will get CT angiogram with contrast along with venous doppler US of legs/ highly suspicious for PE and/or DVT.  He refused to go to ER or go via ambulance.  Will call pt once results come In. Pt will sign AMA form refusing to go to ER via ambulance or vehicle.  Tried to schedule appt for CT angiogram of chest at noon on 7/19/19 at [facility] but insurance approval pending.  Pt was told about this and he still refused to go to ER.  Advised pt to call 911 if symptoms severe or worrisome. Will reschedule  CT angiogram and venous doppler    - will set up [facility] for COPD management. Medication management.  -gait instability/high fall risk - gave prescription for motorized wheelchair. He would benefit with wheelchair since pt is a high fall risk.    -recommend colonoscopy screening  -HLP - lipitor 20 mg PO qhs   -counseled pt to cut [place] n on alcohol >5 minutes. He declines nicotine patch, wellbutrin and chantis   -thrombocytopenia - likely due to liver disease -recommend  Hematology referral with Dr. Serrano . He missed his last appt.   -alcohol abuse/fatty liver - recommend Gastroenterology referral  With RIGOBERTO Saini. He has not had anything to drink in 2 weeks. He is having DTs.  Will give prescription for librium tapering dose   -back pain - recommend x-ray of back  -COPD/COPD exacerbation/hypoxic respiratory failure.  - referred  to Pulmonology Pt is on dulera inhaler. He also    -chest pain/abnormal EKG - pt referred to cardiology. Pt has appt Friday with Dr. Malhotra.    -HTN -stopped lisinopril-HCTZ . Pt is on lisionpril 20 mg dialy  BP is still elevated will start on toprol XL 50 mgdaily.    -recommend aspirin 81 mg daily but he is intoleratn to aspirin   -GERD - protonix 40 mg PO q daily. Possible need EGD and colonoscopy screening  -recheck in 2 weeks   -since pt signed AMA today. Advised to be safe and proceed to ER or call 911 if symptoms severe         This document has been electronically signed by Toby Harrell MD on July 19, 2019 10:03 AM                    This document has been electronically signed by Toby Harrell MD on July 19, 2019 10:03 AM

## 2019-07-19 ENCOUNTER — OFFICE VISIT (OUTPATIENT)
Dept: FAMILY MEDICINE CLINIC | Facility: CLINIC | Age: 64
End: 2019-07-19

## 2019-07-19 VITALS
HEIGHT: 65 IN | WEIGHT: 148 LBS | TEMPERATURE: 97.5 F | SYSTOLIC BLOOD PRESSURE: 138 MMHG | BODY MASS INDEX: 24.66 KG/M2 | DIASTOLIC BLOOD PRESSURE: 80 MMHG | OXYGEN SATURATION: 99 % | HEART RATE: 77 BPM

## 2019-07-19 DIAGNOSIS — I10 ESSENTIAL HYPERTENSION: ICD-10-CM

## 2019-07-19 DIAGNOSIS — J44.9 CHRONIC OBSTRUCTIVE PULMONARY DISEASE, UNSPECIFIED COPD TYPE (HCC): ICD-10-CM

## 2019-07-19 DIAGNOSIS — D64.9 NORMOCYTIC ANEMIA: ICD-10-CM

## 2019-07-19 DIAGNOSIS — M54.40 CHRONIC LOW BACK PAIN WITH SCIATICA, SCIATICA LATERALITY UNSPECIFIED, UNSPECIFIED BACK PAIN LATERALITY: ICD-10-CM

## 2019-07-19 DIAGNOSIS — R79.89 ELEVATED D-DIMER: Primary | ICD-10-CM

## 2019-07-19 DIAGNOSIS — M51.36 DDD (DEGENERATIVE DISC DISEASE), LUMBAR: ICD-10-CM

## 2019-07-19 DIAGNOSIS — R07.1 CHEST PAIN ON BREATHING: ICD-10-CM

## 2019-07-19 DIAGNOSIS — G89.29 CHRONIC LOW BACK PAIN WITH SCIATICA, SCIATICA LATERALITY UNSPECIFIED, UNSPECIFIED BACK PAIN LATERALITY: ICD-10-CM

## 2019-07-19 DIAGNOSIS — Z91.81 AT HIGH RISK FOR FALLS: ICD-10-CM

## 2019-07-19 DIAGNOSIS — E78.5 HYPERLIPIDEMIA, UNSPECIFIED HYPERLIPIDEMIA TYPE: ICD-10-CM

## 2019-07-19 DIAGNOSIS — F10.10 ALCOHOL ABUSE: ICD-10-CM

## 2019-07-19 DIAGNOSIS — F10.939 ALCOHOL WITHDRAWAL SYNDROME WITH COMPLICATION (HCC): ICD-10-CM

## 2019-07-19 DIAGNOSIS — Z72.0 TOBACCO USER: ICD-10-CM

## 2019-07-19 DIAGNOSIS — R06.09 DYSPNEA ON EXERTION: ICD-10-CM

## 2019-07-19 DIAGNOSIS — Z71.6 TOBACCO ABUSE COUNSELING: ICD-10-CM

## 2019-07-19 DIAGNOSIS — R26.81 GAIT INSTABILITY: ICD-10-CM

## 2019-07-19 DIAGNOSIS — K70.10 STEATOHEPATITIS, ALCOHOLIC: ICD-10-CM

## 2019-07-19 DIAGNOSIS — Z71.41 ALCOHOL ABUSE COUNSELING AND SURVEILLANCE: ICD-10-CM

## 2019-07-19 PROCEDURE — 99406 BEHAV CHNG SMOKING 3-10 MIN: CPT | Performed by: FAMILY MEDICINE

## 2019-07-19 PROCEDURE — 99214 OFFICE O/P EST MOD 30 MIN: CPT | Performed by: FAMILY MEDICINE

## 2019-07-19 RX ORDER — CHLORDIAZEPOXIDE HYDROCHLORIDE 25 MG/1
CAPSULE, GELATIN COATED ORAL
Qty: 70 CAPSULE | Refills: 0 | Status: SHIPPED | OUTPATIENT
Start: 2019-07-19 | End: 2020-08-26

## 2019-07-19 NOTE — PATIENT INSTRUCTIONS
Chlordiazepoxide capsules  What is this medicine?  CHLORDIAZEPOXIDE (klor dye az e POX gabriela) is a benzodiazepine. It is used to treat anxiety and alcohol withdrawal.  This medicine may be used for other purposes; ask your health care provider or pharmacist if you have questions.  COMMON BRAND NAME(S): Librium  What should I tell my health care provider before I take this medicine?  They need to know if you have any of these conditions:  -an alcohol or drug abuse problem  -kidney or liver disease  -suicidal thoughts  -an unusual or allergic reaction to chlordiazepoxide, other benzodiazepines, foods, dyes, or preservatives  -pregnant or trying to get pregnant  -breast-feeding  How should I use this medicine?  Take this medicine by mouth with a glass of water. Follow the directions on the prescription label. Take your medicine at regular intervals. Do not take it more often than directed. Do not stop taking except on your doctor's advice.  A special MedGuide will be given to you by the pharmacist with each prescription and refill. Be sure to read this information carefully each time.  Talk to your pediatrician regarding the use of this medicine in children. While this drug may be prescribed for children as young as 6 years for selected conditions, precautions do apply.  Overdosage: If you think you have taken too much of this medicine contact a poison control center or emergency room at once.  NOTE: This medicine is only for you. Do not share this medicine with others.  What if I miss a dose?  If you miss a dose, take it as soon as you can. If it is almost time for your next dose, take only that dose. Do not take double or extra doses.  What may interact with this medicine?  Do not take this medication with any of the following medicines:  -narcotic medicines for cough  -sodium oxybate  This medicine may also interact with the following medications:  -alcohol  -antihistamines for allergy, cough and cold  -antiviral  medicines for HIV or AIDS  -certain medicines for anxiety or sleep  -certain medicines for depression, like amitriptyline, fluoxetine, sertraline  -certain medicines for seizures like carbamazepine, phenobarbital, phenytoin, primidone  -cimetidine  -general anesthetics like halothane, isoflurane, methoxyflurane, propofol  -local anesthetics like lidocaine, pramoxine, tetracaine  -medicines that relax muscles for surgery  -narcotic medicines for pain  -phenothiazines like chlorpromazine, mesoridazine, prochlorperazine, thioridazine  -warfarin  This list may not describe all possible interactions. Give your health care provider a list of all the medicines, herbs, non-prescription drugs, or dietary supplements you use. Also tell them if you smoke, drink alcohol, or use illegal drugs. Some items may interact with your medicine.  What should I watch for while using this medicine?  Tell your doctor or health care professional if your symptoms do not start to get better or if they get worse.  Do not stop taking except on your doctor's advice. You may develop a severe reaction. Your doctor will tell you how much medicine to take.  You may get drowsy or dizzy. Do not drive, use machinery, or do anything that needs mental alertness until you know how this medicine affects you. To reduce the risk of dizzy and fainting spells, do not stand or sit up quickly, especially if you are an older patient. Alcohol may increase dizziness and drowsiness. Avoid alcoholic drinks.  If you are taking another medicine that also causes drowsiness, you may have more side effects. Give your health care provider a list of all medicines you use. Your doctor will tell you how much medicine to take. Do not take more medicine than directed. Call emergency for help if you have problems breathing or unusual sleepiness.  What side effects may I notice from receiving this medicine?  Side effects that you should report to your doctor or health care  professional as soon as possible:  -allergic reactions like skin rash, itching or hives, swelling of the face, lips, or tongue  -breathing problems  -confusion  -loss of balance or coordination  -signs and symptoms of low blood pressure like dizziness; feeling faint or lightheaded, falls; unusually weak or tired  -suicidal thoughts or other mood changes  Side effects that usually do not require medical attention (report to your doctor or health care professional if they continue or are bothersome):  -constipation  -irritable  -nausea  -tiredness  This list may not describe all possible side effects. Call your doctor for medical advice about side effects. You may report side effects to FDA at 2-674-FDA-3173.  Where should I keep my medicine?  Keep out of the reach of children. This medicine can be abused. Keep your medicine in a safe place to protect it from theft. Do not share this medicine with anyone. Selling or giving away this medicine is dangerous and against the law.  This medicine may cause accidental overdose and death if taken by other adults, children, or pets. Mix any unused medicine with a substance like cat litter or coffee grounds. Then throw the medicine away in a sealed container like a sealed bag or a coffee can with a lid. Do not use the medicine after the expiration date.  Store at room temperature between 15 and 30 degrees C (59 and 86 degrees F).  NOTE: This sheet is a summary. It may not cover all possible information. If you have questions about this medicine, talk to your doctor, pharmacist, or health care provider.  © 2019 [employer] (2016-09-15 15:33:22)

## 2019-07-23 ENCOUNTER — TELEPHONE (OUTPATIENT)
Dept: FAMILY MEDICINE CLINIC | Facility: CLINIC | Age: 64
End: 2019-07-23

## 2019-07-23 ENCOUNTER — LAB REQUISITION (OUTPATIENT)
Dept: LAB | Facility: HOSPITAL | Age: 64
End: 2019-07-23

## 2019-07-23 DIAGNOSIS — I95.9 HYPOTENSION: ICD-10-CM

## 2019-07-23 DIAGNOSIS — J44.1 CHRONIC OBSTRUCTIVE PULMONARY DISEASE WITH ACUTE EXACERBATION (HCC): ICD-10-CM

## 2019-07-23 LAB
BASOPHILS # BLD AUTO: 0.06 10*3/MM3 (ref 0–0.2)
BASOPHILS NFR BLD AUTO: 0.5 % (ref 0–1.5)
DEPRECATED RDW RBC AUTO: 52.5 FL (ref 37–54)
EOSINOPHIL # BLD AUTO: 0.38 10*3/MM3 (ref 0–0.4)
EOSINOPHIL NFR BLD AUTO: 3 % (ref 0.3–6.2)
ERYTHROCYTE [DISTWIDTH] IN BLOOD BY AUTOMATED COUNT: 15.6 % (ref 12.3–15.4)
HCT VFR BLD AUTO: 33.1 % (ref 37.5–51)
HGB BLD-MCNC: 11.3 G/DL (ref 13–17.7)
IMM GRANULOCYTES # BLD AUTO: 0.08 10*3/MM3 (ref 0–0.05)
IMM GRANULOCYTES NFR BLD AUTO: 0.6 % (ref 0–0.5)
LYMPHOCYTES # BLD AUTO: 1.39 10*3/MM3 (ref 0.7–3.1)
LYMPHOCYTES NFR BLD AUTO: 11.1 % (ref 19.6–45.3)
MCH RBC QN AUTO: 31.3 PG (ref 26.6–33)
MCHC RBC AUTO-ENTMCNC: 34.1 G/DL (ref 31.5–35.7)
MCV RBC AUTO: 91.7 FL (ref 79–97)
MONOCYTES # BLD AUTO: 1.46 10*3/MM3 (ref 0.1–0.9)
MONOCYTES NFR BLD AUTO: 11.7 % (ref 5–12)
NEUTROPHILS # BLD AUTO: 9.16 10*3/MM3 (ref 1.7–7)
NEUTROPHILS NFR BLD AUTO: 73.1 % (ref 42.7–76)
NRBC BLD AUTO-RTO: 0 /100 WBC (ref 0–0.2)
PLATELET # BLD AUTO: 257 10*3/MM3 (ref 140–450)
PMV BLD AUTO: 11.1 FL (ref 6–12)
RBC # BLD AUTO: 3.61 10*6/MM3 (ref 4.14–5.8)
WBC NRBC COR # BLD: 12.53 10*3/MM3 (ref 3.4–10.8)

## 2019-07-23 PROCEDURE — 85025 COMPLETE CBC W/AUTO DIFF WBC: CPT | Performed by: FAMILY MEDICINE

## 2019-07-26 ENCOUNTER — LAB REQUISITION (OUTPATIENT)
Dept: LAB | Facility: HOSPITAL | Age: 64
End: 2019-07-26

## 2019-07-26 ENCOUNTER — TELEPHONE (OUTPATIENT)
Dept: FAMILY MEDICINE CLINIC | Facility: CLINIC | Age: 64
End: 2019-07-26

## 2019-07-26 DIAGNOSIS — J44.1 CHRONIC OBSTRUCTIVE PULMONARY DISEASE WITH ACUTE EXACERBATION (HCC): ICD-10-CM

## 2019-07-26 DIAGNOSIS — J96.21 ACUTE AND CHRONIC RESPIRATORY FAILURE WITH HYPOXIA (HCC): ICD-10-CM

## 2019-07-26 LAB
ALBUMIN SERPL-MCNC: 4.9 G/DL (ref 3.5–5.2)
ALBUMIN/GLOB SERPL: 1.6 G/DL
ALP SERPL-CCNC: 71 U/L (ref 39–117)
ALT SERPL W P-5'-P-CCNC: 11 U/L (ref 1–41)
ANION GAP SERPL CALCULATED.3IONS-SCNC: 16 MMOL/L (ref 5–15)
AST SERPL-CCNC: 17 U/L (ref 1–40)
BILIRUB SERPL-MCNC: 0.4 MG/DL (ref 0.2–1.2)
BUN BLD-MCNC: 12 MG/DL (ref 8–23)
BUN/CREAT SERPL: 15.6 (ref 7–25)
CALCIUM SPEC-SCNC: 9.7 MG/DL (ref 8.6–10.5)
CHLORIDE SERPL-SCNC: 95 MMOL/L (ref 98–107)
CO2 SERPL-SCNC: 24 MMOL/L (ref 22–29)
CREAT BLD-MCNC: 0.77 MG/DL (ref 0.76–1.27)
GFR SERPL CREATININE-BSD FRML MDRD: 102 ML/MIN/1.73
GLOBULIN UR ELPH-MCNC: 3.1 GM/DL
GLUCOSE BLD-MCNC: 103 MG/DL (ref 65–99)
POTASSIUM BLD-SCNC: 4.3 MMOL/L (ref 3.5–5.2)
PROT SERPL-MCNC: 8 G/DL (ref 6–8.5)
SODIUM BLD-SCNC: 135 MMOL/L (ref 136–145)

## 2019-07-26 PROCEDURE — 80053 COMPREHEN METABOLIC PANEL: CPT | Performed by: FAMILY MEDICINE

## 2019-07-29 ENCOUNTER — TELEPHONE (OUTPATIENT)
Dept: FAMILY MEDICINE CLINIC | Facility: CLINIC | Age: 64
End: 2019-07-29

## 2019-07-31 ENCOUNTER — TELEPHONE (OUTPATIENT)
Dept: FAMILY MEDICINE CLINIC | Facility: CLINIC | Age: 64
End: 2019-07-31

## 2019-08-01 ENCOUNTER — TELEPHONE (OUTPATIENT)
Dept: FAMILY MEDICINE CLINIC | Facility: CLINIC | Age: 64
End: 2019-08-01

## 2019-08-01 RX ORDER — LISINOPRIL 20 MG/1
20 TABLET ORAL DAILY
Qty: 30 TABLET | Refills: 3 | Status: SHIPPED | OUTPATIENT
Start: 2019-08-01 | End: 2019-10-11 | Stop reason: SDUPTHER

## 2019-08-01 RX ORDER — PANTOPRAZOLE SODIUM 40 MG/1
40 TABLET, DELAYED RELEASE ORAL DAILY
Qty: 30 TABLET | Refills: 3 | Status: SHIPPED | OUTPATIENT
Start: 2019-08-01 | End: 2019-10-11 | Stop reason: SDUPTHER

## 2019-08-01 RX ORDER — ATORVASTATIN CALCIUM 20 MG/1
20 TABLET, FILM COATED ORAL DAILY
Qty: 30 TABLET | Refills: 3 | Status: SHIPPED | OUTPATIENT
Start: 2019-08-01 | End: 2019-10-11 | Stop reason: SDUPTHER

## 2019-08-01 RX ORDER — METOPROLOL SUCCINATE 25 MG/1
25 TABLET, EXTENDED RELEASE ORAL DAILY
Qty: 30 TABLET | Refills: 3 | Status: SHIPPED | OUTPATIENT
Start: 2019-08-01 | End: 2019-10-11 | Stop reason: SDUPTHER

## 2019-08-01 NOTE — TELEPHONE ENCOUNTER
Medical Assitant and I tried to call pt to remind him about his CT angiogram to rule out PE. It Has been scheduled at imaging center on 8/2//19 at 8 am. There was no answer an no voicemail capability     Pt did not show for his appt today.    Called Home Health Nurse to see if she can contact pt and  contrast at imaging center. And to remind him of imaging           This document has been electronically signed by Toby Harrell MD on August 1, 2019 4:35 PM

## 2019-08-14 ENCOUNTER — TELEPHONE (OUTPATIENT)
Dept: FAMILY MEDICINE CLINIC | Facility: CLINIC | Age: 64
End: 2019-08-14

## 2019-08-15 ENCOUNTER — TELEPHONE (OUTPATIENT)
Dept: FAMILY MEDICINE CLINIC | Facility: CLINIC | Age: 64
End: 2019-08-15

## 2019-08-20 NOTE — PROGRESS NOTES
Subjective:  Khadar Dent is a 64 y.o. male who presents for       Patient Active Problem List   Diagnosis   • Annual physical exam   • General medical examination   • Encounter for tobacco use cessation counseling   • Encounter for screening for cardiovascular disorders   • Encounter for screening for endocrine disorder   • Chronic obstructive pulmonary disease (CMS/HCC)   • Tobacco abuse counseling   • Tobacco user   • Dyspnea   • Back pain   • Elevated blood pressure reading   • Alcohol abuse counseling and surveillance   • Alcohol abuse   • Tachycardia   • Chest pain   • Essential hypertension   • Thrombocytopenia (CMS/HCC)   • Hyperlipidemia   • Steatohepatitis, alcoholic   • Enteritis   • Nausea and vomiting   • Acute exacerbation of chronic obstructive pulmonary disease (COPD) (CMS/HCC)   • Acute on chronic respiratory failure with hypoxia (CMS/HCC)   • At high risk for falls   • Gait instability   • DDD (degenerative disc disease), lumbar   • Chronic low back pain with sciatica   • Normocytic anemia   • Elevated d-dimer   • Alcohol withdrawal syndrome with complication (CMS/HCC)   • Superficial thrombophlebitis of left upper extremity   • Hyponatremia           Current Outpatient Medications:   •  albuterol (ACCUNEB) 1.25 MG/3ML nebulizer solution, Take 3 mL by nebulization Every 6 (Six) Hours As Needed for Wheezing., Disp: 1 vial, Rfl: 3  •  albuterol sulfate  (90 Base) MCG/ACT inhaler, Inhale 2 puffs Every 6 (Six) Hours As Needed for Wheezing., Disp: 1 inhaler, Rfl: 3  •  atorvastatin (LIPITOR) 20 MG tablet, Take 1 tablet by mouth Daily., Disp: 30 tablet, Rfl: 3  •  chlordiazePOXIDE (LIBRIUM) 25 MG capsule, Take 4 pills daily for 1 week 3 pills daily for 2nd week 2 pills daily for 3rd week and 1 pill daily for 4th week, Disp: 70 capsule, Rfl: 0  •  cyclobenzaprine (FLEXERIL) 5 MG tablet, Take 1 tablet by mouth 3 (Three) Times a Day As Needed for Muscle Spasms., Disp: 90 tablet, Rfl: 2  •   fluticasone-salmeterol (WIXELA INHUB) 500-50 MCG/DOSE DISKUS, Inhale 1 puff 2 (Two) Times a Day., Disp: 60 each, Rfl: 3  •  INCRUSE ELLIPTA 62.5 MCG/INH aerosol powder , , Disp: , Rfl:   •  lisinopril (PRINIVIL,ZESTRIL) 20 MG tablet, Take 1 tablet by mouth Daily., Disp: 30 tablet, Rfl: 3  •  methocarbamol (ROBAXIN) 750 MG tablet, , Disp: , Rfl:   •  metoprolol succinate XL (TOPROL XL) 25 MG 24 hr tablet, Take 1 tablet by mouth Daily., Disp: 30 tablet, Rfl: 3  •  nicotine (NICODERM CQ) 21 MG/24HR patch, Place 1 patch on the skin as directed by provider Daily., Disp: 30 patch, Rfl: 3  •  ondansetron ODT (ZOFRAN ODT) 8 MG disintegrating tablet, Take 1 tablet by mouth Every 8 (Eight) Hours As Needed for Nausea or Vomiting., Disp: 90 tablet, Rfl: 3  •  pantoprazole (PROTONIX) 40 MG EC tablet, Take 1 tablet by mouth Daily., Disp: 30 tablet, Rfl: 3  •  sodium chloride 1 g tablet, Take 1 g by mouth Daily., Disp: , Rfl:     Current Facility-Administered Medications:   •  methylPREDNISolone sodium succinate (SOLU-Medrol) injection 125 mg, 125 mg, Intravenous, Q6H, Toby Harrell MD, 125 mg at 05/22/19 1020    HPI     7/19/19 pt is 63 yo male with history of severe COPD, HTN, HLP, history of enteririts. Gait instability. Chronic lower back pain,, moderate to severe loss of L5-S1,  DDD of lumbar spine, Tobacco use,heavy allcohol use, fatty liver disease.  He did see Hematologist on 7/10/19 Dr. Serrano for history of thrombocytopenia.  He did have labwork recently that showed D-dimer elveated >1000. Vitamin B12 was normal hepatitis panel was negative. foatle was normal.  CBC showed hemoglobin at 10.5 with platelets at 215.  Peripheral blood smear showed slight normocytic normochromic anemia without schistocytes.  Tear drop cells. He is liking Home Health. He has chest pain and today it is 2/10     8/23/19 pt is her for recheck and followup. He went ot [facility] and had  Upper venous duplex that showed supeficial  thrombplebitis but no upper DVT. He was sent to [facility] on 8/15/19 and had CT of chest with contrast that showed no DVT  He had stable chornic changes and area of peripheral consolidation in  Right lower lobe  On  CBC his  Hemoglobin was at 11.9 with HCT at 34.6.  Platelet count was at 144 troponin was negative  D-dimer negative.   He saw Pulmonologist yesterday He did not change  Medicatins. He continues to take wixhela and albuterol inhaler. He smokes 3-4 cigaretttes a day. He continues to drink alcohol   Sodium is at 126 with urine osmolality at 241.   He is euvolemic. No history of CHF  He does not drink a a lot of water         Shortness of Breath   This is a chronic problem. The current episode started more than 1 year ago. The problem occurs constantly. Associated symptoms include abdominal pain and chest pain. Pertinent negatives include no claudication, coryza, ear pain, fever, headaches, hemoptysis, leg pain, leg swelling, neck pain, orthopnea, PND, rash, rhinorrhea, sore throat, sputum production, syncope, vomiting or wheezing. Nothing aggravates the symptoms. Risk factors include smoking. He has tried beta agonist inhalers, oral steroids and steroid inhalers for the symptoms. The treatment provided moderate relief. His past medical history is significant for allergies, aspirin allergies, chronic lung disease and COPD. There is no history of asthma, bronchiolitis, CAD, DVT, a heart failure, PE, pneumonia or a recent surgery.    Abdominal Pain   This is a chronic problem. The current episode started more than 1 year ago. The onset quality is gradual. The problem has been gradually worsening. The pain is at a severity of 4/10. The pain is moderate. The quality of the pain is aching. The abdominal pain does not radiate. Associated symptoms include headaches. Pertinent negatives include  no anorexia, arthralgias, belching, constipation, diarrhea, dysuria, fever, flatus, hematochezia, hematuria, melena, myalgias, nausea, vomiting or weight loss. The pain is aggravated by bowel movement and deep breathing. The pain is relieved by nothing. He has tried nothing for the symptoms. Prior diagnostic workup includes CT scan and upper endoscopy. His past medical history is significant for GERD. There is no history of abdominal surgery, colon cancer, Crohn's disease, gallstones, pancreatitis, PUD or ulcerative colitis.    Chest Pain    The current episode started more than 1 year ago. The onset quality is gradual. The problem occurs intermittently. The problem has been waxing and waning. The pain is present in the substernal region and lateral region. The pain is at a severity of 5/10. The pain is moderate. The quality of the pain is described as numbness, pressure and tightness. Associated symptoms include back pain, dizziness, exertional chest pressure, headaches, numbness, palpitations, shortness of breath and weakness. Pertinent negatives include no abdominal pain, claudication, cough, diaphoresis, fever, hemoptysis, irregular heartbeat, leg pain, lower extremity edema, malaise/fatigue, nausea, near-syncope, orthopnea, PND, sputum production, syncope or vomiting. The pain is aggravated by breathing, coughing, deep breathing, movement, walking and exertion. The treatment provided no relief. Risk factors include alcohol intake, being elderly, lack of exercise, smoking/tobacco exposure, sedentary lifestyle and stress.   His past medical history is significant for hypertension.   Pertinent negatives for past medical history include no aneurysm, no anxiety/panic attacks, no aortic aneurysm, no aortic dissection, no arrhythmia, no bicuspid aortic valve, no CAD, no cancer, no congenital heart disease, no connective tissue disease, no COPD, no CHF, no diabetes, no DVT, no hyperhomocysteinemia, no  hyperlipidemia, no Kawasaki disease, no Marfan's syndrome, no MI, no mitral valve prolapse, no pacemaker, no PE, no PVD, no recent injury, no rheumatic fever, no seizures, no sickle cell disease, no sleep apnea, no spontaneous pneumothorax, no stimulant use, no strokes, no thyroid problem, no TIA, Shin syndrome and no valve disorder.   Hypertension   This is a chronic problem. The current episode started more than 1 year ago. The problem has been waxing and waning since onset. The problem is uncontrolled. Associated symptoms include chest pain, headaches, neck pain, palpitations and shortness of breath. Pertinent negatives include no anxiety, malaise/fatigue, orthopnea, peripheral edema, PND or sweats. Risk factors for coronary artery disease include male gender, sedentary lifestyle, smoking/tobacco exposure and dyslipidemia. Past treatments include ACE inhibitors and diuretics. Current antihypertension treatment includes ACE inhibitors and diuretics. The current treatment provides no improvement. There are no compliance problems.  There is no history of angina, kidney disease, CAD/MI, CVA, heart failure, left ventricular hypertrophy, PVD or retinopathy. There is no history of chronic renal disease, coarctation of the aorta, hyperaldosteronism, hypercortisolism, hyperparathyroidism, a hypertension causing med, pheochromocytoma, renovascular disease, sleep apnea or a thyroid problem.   Back Pain   This is a chronic problem. The current episode started more than 1 year ago. The problem occurs constantly. The problem is unchanged. The pain is present in the lumbar spine and sacro-iliac. The quality of the pain is described as aching. Stiffness is present all day. Associated symptoms include chest pain, headaches, numbness and weakness. Pertinent negatives include no abdominal pain, bladder incontinence, bowel incontinence, dysuria, fever, leg pain, paresis, paresthesias, pelvic pain, perianal numbness, tingling or  weight loss. The treatment provided no relief.   Alcohol Problem   This is a chronic problem. The current episode started more than 1 year ago. The problem occurs constantly. The problem has been unchanged. Associated symptoms include arthralgias, chest pain, fatigue, headaches, joint swelling, myalgias, neck pain, numbness and weakness. Pertinent negatives include no abdominal pain, anorexia, change in bowel habit, chills, congestion, coughing, diaphoresis, fever, nausea, rash, sore throat, swollen glands, urinary symptoms, vertigo, visual change or vomiting. Nothing aggravates the symptoms. He has tried nothing for the symptoms. The treatment provided no relief.    COPD   This is a new problem. The current episode started more than 1 year ago. The problem occurs constantly. Associated symptoms include arthralgias, chest pain, fatigue, headaches and weakness. Pertinent negatives include no abdominal pain, anorexia, change in bowel habit, chills, congestion, coughing, diaphoresis, fever, joint swelling, myalgias, nausea, neck pain or numbness       Review of Systems  Review of Systems   Constitutional: Positive for activity change and fatigue. Negative for appetite change, chills, diaphoresis and fever.   HENT: Negative for congestion, postnasal drip, rhinorrhea, sinus pressure, sinus pain, sneezing, sore throat, trouble swallowing and voice change.    Respiratory: Positive for cough, chest tightness and shortness of breath. Negative for choking, wheezing and stridor.    Cardiovascular: Positive for chest pain.   Gastrointestinal: Negative for diarrhea, nausea and vomiting.   Musculoskeletal: Positive for arthralgias, back pain, gait problem, joint swelling and myalgias.   Neurological: Positive for weakness, numbness and headaches.       Patient Active Problem List   Diagnosis   • Annual physical exam   • General medical examination   • Encounter for tobacco use cessation counseling   • Encounter for screening for  "cardiovascular disorders   • Encounter for screening for endocrine disorder   • Chronic obstructive pulmonary disease (CMS/HCC)   • Tobacco abuse counseling   • Tobacco user   • Dyspnea   • Back pain   • Elevated blood pressure reading   • Alcohol abuse counseling and surveillance   • Alcohol abuse   • Tachycardia   • Chest pain   • Essential hypertension   • Thrombocytopenia (CMS/HCC)   • Hyperlipidemia   • Steatohepatitis, alcoholic   • Enteritis   • Nausea and vomiting   • Acute exacerbation of chronic obstructive pulmonary disease (COPD) (CMS/HCC)   • Acute on chronic respiratory failure with hypoxia (CMS/HCC)   • At high risk for falls   • Gait instability   • DDD (degenerative disc disease), lumbar   • Chronic low back pain with sciatica   • Normocytic anemia   • Elevated d-dimer   • Alcohol withdrawal syndrome with complication (CMS/[facility])   • Superficial thrombophlebitis of left upper extremity   • Hyponatremia     Past Surgical History:   Procedure Laterality Date   • BACK SURGERY     • CATARACT EXTRACTION     • COLONOSCOPY      pt states \"been a while back\" polyps found   • SHOULDER SURGERY       Social History     Socioeconomic History   • Marital status:      Spouse name: Not on file   • Number of children: Not on file   • Years of education: Not on file   • Highest education level: Not on file   Tobacco Use   • Smoking status: Current Every Day Smoker   • Smokeless tobacco: Never Used   Substance and Sexual Activity   • Alcohol use: Yes     Frequency: 4 or more times a week     Drinks per session: 1 or 2     Family History   Problem Relation Age of Onset   • Heart disease Mother    • Stroke Mother    • Stroke Father    • Heart disease Father    • Heart disease Brother      Lab Requisition on 07/26/2019   Component Date Value Ref Range Status   • Glucose 07/26/2019 103* 65 - 99 mg/dL Final   • BUN 07/26/2019 12  8 - 23 mg/dL Final   • Creatinine 07/26/2019 0.77  0.76 - 1.27 mg/dL Final   • Sodium " 07/26/2019 135* 136 - 145 mmol/L Final   • Potassium 07/26/2019 4.3  3.5 - 5.2 mmol/L Final   • Chloride 07/26/2019 95* 98 - 107 mmol/L Final   • CO2 07/26/2019 24.0  22.0 - 29.0 mmol/L Final   • Calcium 07/26/2019 9.7  8.6 - 10.5 mg/dL Final   • Total Protein 07/26/2019 8.0  6.0 - 8.5 g/dL Final   • Albumin 07/26/2019 4.90  3.50 - 5.20 g/dL Final   • ALT (SGPT) 07/26/2019 11  1 - 41 U/L Final   • AST (SGOT) 07/26/2019 17  1 - 40 U/L Final   • Alkaline Phosphatase 07/26/2019 71  39 - 117 U/L Final   • Total Bilirubin 07/26/2019 0.4  0.2 - 1.2 mg/dL Final   • eGFR Non African Amer 07/26/2019 102  >60 mL/min/1.73 Final   • Globulin 07/26/2019 3.1  gm/dL Final   • A/G Ratio 07/26/2019 1.6  g/dL Final   • BUN/Creatinine Ratio 07/26/2019 15.6  7.0 - 25.0 Final   • Anion Gap 07/26/2019 16.0* 5.0 - 15.0 mmol/L Final   Lab Requisition on 07/23/2019   Component Date Value Ref Range Status   • WBC 07/23/2019 12.53* 3.40 - 10.80 10*3/mm3 Final   • RBC 07/23/2019 3.61* 4.14 - 5.80 10*6/mm3 Final   • Hemoglobin 07/23/2019 11.3* 13.0 - 17.7 g/dL Final   • Hematocrit 07/23/2019 33.1* 37.5 - 51.0 % Final   • MCV 07/23/2019 91.7  79.0 - 97.0 fL Final   • MCH 07/23/2019 31.3  26.6 - 33.0 pg Final   • MCHC 07/23/2019 34.1  31.5 - 35.7 g/dL Final   • RDW 07/23/2019 15.6* 12.3 - 15.4 % Final   • RDW-SD 07/23/2019 52.5  37.0 - 54.0 fl Final   • MPV 07/23/2019 11.1  6.0 - 12.0 fL Final   • Platelets 07/23/2019 257  140 - 450 10*3/mm3 Final   • Neutrophil % 07/23/2019 73.1  42.7 - 76.0 % Final   • Lymphocyte % 07/23/2019 11.1* 19.6 - 45.3 % Final   • Monocyte % 07/23/2019 11.7  5.0 - 12.0 % Final   • Eosinophil % 07/23/2019 3.0  0.3 - 6.2 % Final   • Basophil % 07/23/2019 0.5  0.0 - 1.5 % Final   • Immature Grans % 07/23/2019 0.6* 0.0 - 0.5 % Final   • Neutrophils, Absolute 07/23/2019 9.16* 1.70 - 7.00 10*3/mm3 Final   • Lymphocytes, Absolute 07/23/2019 1.39  0.70 - 3.10 10*3/mm3 Final   • Monocytes, Absolute 07/23/2019 1.46* 0.10 - 0.90  10*3/mm3 Final   • Eosinophils, Absolute 07/23/2019 0.38  0.00 - 0.40 10*3/mm3 Final   • Basophils, Absolute 07/23/2019 0.06  0.00 - 0.20 10*3/mm3 Final   • Immature Grans, Absolute 07/23/2019 0.08* 0.00 - 0.05 10*3/mm3 Final   • nRBC 07/23/2019 0.0  0.0 - 0.2 /100 WBC Final   Lab on 07/10/2019   Component Date Value Ref Range Status   • Folate 07/10/2019 18.60  4.78 - 24.20 ng/mL Final   • Performed by: 07/10/2019 Wes Peralta MD   Final   • Pathologist Interpretation 07/10/2019    Final                    Value:Slight normocytic normochromic anemia without schistocytes, tear drop cells, inclusions, microcytes, normoblasts or rouleaux.  Platelet count 215 (normal, CBC 7/10/2019).  No platelet satellitism.  Rare megathrombocytes present.  White cell count 5.38 (normal, CBC 7/10/2019).  Left shift or myeloblasts not identified.   • Hepatitis B Surface Ag 07/10/2019 Non-Reactive  Non-Reactive Final   • Hep A IgM 07/10/2019 Non-Reactive  Non-Reactive Final   • Hep B C IgM 07/10/2019 Non-Reactive  Non-Reactive Final   • Hepatitis C Ab 07/10/2019 Non-Reactive  Non-Reactive Final   • Vitamin B-12 07/10/2019 480  211 - 946 pg/mL Final   • D-Dimer, Quantitative 07/10/2019 1,052* 0 - 470 ng/mL (FEU) Final   • WBC 07/10/2019 5.38  3.40 - 10.80 10*3/mm3 Final   • RBC 07/10/2019 3.51* 4.14 - 5.80 10*6/mm3 Final   • Hemoglobin 07/10/2019 10.5* 13.0 - 17.7 g/dL Final   • Hematocrit 07/10/2019 30.1* 37.5 - 51.0 % Final   • MCV 07/10/2019 85.8  79.0 - 97.0 fL Final   • MCH 07/10/2019 29.9  26.6 - 33.0 pg Final   • MCHC 07/10/2019 34.9  31.5 - 35.7 g/dL Final   • RDW 07/10/2019 13.8  12.3 - 15.4 % Final   • RDW-SD 07/10/2019 43.0  37.0 - 54.0 fl Final   • MPV 07/10/2019 9.7  6.0 - 12.0 fL Final   • Platelets 07/10/2019 215  140 - 450 10*3/mm3 Final   • Neutrophil % 07/10/2019 60.2  42.7 - 76.0 % Final   • Lymphocyte % 07/10/2019 24.7  19.6 - 45.3 % Final   • Monocyte % 07/10/2019 11.0  5.0 - 12.0 % Final   • Eosinophil %  07/10/2019 2.6  0.3 - 6.2 % Final   • Basophil % 07/10/2019 1.1  0.0 - 1.5 % Final   • Immature Grans % 07/10/2019 0.4  0.0 - 0.5 % Final   • Neutrophils, Absolute 07/10/2019 3.24  1.70 - 7.00 10*3/mm3 Final   • Lymphocytes, Absolute 07/10/2019 1.33  0.70 - 3.10 10*3/mm3 Final   • Monocytes, Absolute 07/10/2019 0.59  0.10 - 0.90 10*3/mm3 Final   • Eosinophils, Absolute 07/10/2019 0.14  0.00 - 0.40 10*3/mm3 Final   • Basophils, Absolute 07/10/2019 0.06  0.00 - 0.20 10*3/mm3 Final   • Immature Grans, Absolute 07/10/2019 0.02  0.00 - 0.05 10*3/mm3 Final   • nRBC 07/10/2019 0.0  0.0 - 0.2 /100 WBC Final   Lab on 04/08/2019   Component Date Value Ref Range Status   • WBC 04/08/2019 4.44  3.40 - 10.80 10*3/mm3 Final   • RBC 04/08/2019 4.44  4.14 - 5.80 10*6/mm3 Final   • Hemoglobin 04/08/2019 14.0  13.0 - 17.7 g/dL Final   • Hematocrit 04/08/2019 41.5  37.5 - 51.0 % Final   • MCV 04/08/2019 93.5  79.0 - 97.0 fL Final   • MCH 04/08/2019 31.5  26.6 - 33.0 pg Final   • MCHC 04/08/2019 33.7  31.5 - 35.7 g/dL Final   • RDW 04/08/2019 13.6  12.3 - 15.4 % Final   • RDW-SD 04/08/2019 46.5  37.0 - 54.0 fl Final   • MPV 04/08/2019 12.2* 6.0 - 12.0 fL Final   • Platelets 04/08/2019 119* 140 - 450 10*3/mm3 Final   • Neutrophil % 04/08/2019 61.7  42.7 - 76.0 % Final   • Lymphocyte % 04/08/2019 23.9  19.6 - 45.3 % Final   • Monocyte % 04/08/2019 9.9  5.0 - 12.0 % Final   • Eosinophil % 04/08/2019 2.9  0.3 - 6.2 % Final   • Basophil % 04/08/2019 1.4  0.0 - 1.5 % Final   • Immature Grans % 04/08/2019 0.2  0.0 - 0.5 % Final   • Neutrophils, Absolute 04/08/2019 2.74  1.40 - 7.00 10*3/mm3 Final   • Lymphocytes, Absolute 04/08/2019 1.06  0.70 - 3.10 10*3/mm3 Final   • Monocytes, Absolute 04/08/2019 0.44  0.10 - 0.90 10*3/mm3 Final   • Eosinophils, Absolute 04/08/2019 0.13  0.00 - 0.40 10*3/mm3 Final   • Basophils, Absolute 04/08/2019 0.06  0.00 - 0.20 10*3/mm3 Final   • Immature Grans, Absolute 04/08/2019 0.01  0.00 - 0.05 10*3/mm3 Final   •  nRBC 04/08/2019 0.0  0.0 - 0.0 /100 WBC Final   • Glucose 04/08/2019 71  65 - 99 mg/dL Final   • BUN 04/08/2019 10  8 - 23 mg/dL Final   • Creatinine 04/08/2019 0.74* 0.76 - 1.27 mg/dL Final   • Sodium 04/08/2019 133* 136 - 145 mmol/L Final   • Potassium 04/08/2019 4.2  3.5 - 5.2 mmol/L Final   • Chloride 04/08/2019 90* 98 - 107 mmol/L Final   • CO2 04/08/2019 23.3  22.0 - 29.0 mmol/L Final   • Calcium 04/08/2019 9.1  8.6 - 10.5 mg/dL Final   • Total Protein 04/08/2019 8.6* 6.0 - 8.5 g/dL Final   • Albumin 04/08/2019 4.80  3.50 - 5.20 g/dL Final   • ALT (SGPT) 04/08/2019 18  1 - 41 U/L Final   • AST (SGOT) 04/08/2019 40  1 - 40 U/L Final   • Alkaline Phosphatase 04/08/2019 63  39 - 117 U/L Final   • Total Bilirubin 04/08/2019 0.4  0.2 - 1.2 mg/dL Final   • eGFR Non African Amer 04/08/2019 107  >60 mL/min/1.73 Final   • Globulin 04/08/2019 3.8  gm/dL Final   • A/G Ratio 04/08/2019 1.3  g/dL Final   • BUN/Creatinine Ratio 04/08/2019 13.5  7.0 - 25.0 Final   • Anion Gap 04/08/2019 19.7  mmol/L Final   • Hemoglobin A1C 04/08/2019 4.78* 4.80 - 5.60 % Final   • Total Cholesterol 04/08/2019 165  0 - 200 mg/dL Final   • Triglycerides 04/08/2019 45  0 - 150 mg/dL Final   • HDL Cholesterol 04/08/2019 115* 40 - 60 mg/dL Final   • LDL Cholesterol  04/08/2019 41  0 - 100 mg/dL Final   • VLDL Cholesterol 04/08/2019 9  5 - 40 mg/dL Final   • LDL/HDL Ratio 04/08/2019 0.36   Final   • TSH 04/08/2019 1.250  0.270 - 4.200 mIU/mL Final   • Free T4 04/08/2019 1.28  0.93 - 1.70 ng/dL Final   • T3, Free 04/08/2019 3.69  2.00 - 4.40 pg/mL Final   • 25 Hydroxy, Vitamin D 04/08/2019 28.3* 30.0 - 100.0 ng/ml Final      XR Spine Lumbar AP & Lateral  Narrative: PROCEDURE: Lumbar spine 3 views    REASON FOR EXAM: back pain, M54.9 Dorsalgia, unspecified    FINDINGS: . Lumbar spine vertebral body heights and alignment are  normal. There is no evidence of fracture or dislocation. Moderate  to severe loss of L5-S1 intervertebral disc space height  "with  associated anterior osteophytosis consistent with degenerative  disc disease. Otherwise intervertebral disc spaces are intact.  Impression: 1.Moderate to severe loss of L5-S1 intervertebral disc space  height with associated anterior osteophytosis consistent with  degenerative disc disease.   2. No acute lumbar spine abnormality..    Electronically signed by:  Manjeet Hughes MD  5/30/2019 2:31 PM CDT  Workstation: [id number]  XR Chest PA & Lateral  Narrative: Chest PA and lateral     CLINICAL INDICATION: Shortness of breath    COMPARISON: None    FINDINGS: Cardiac silhouette is normal in size. Pulmonary  vascularity is unremarkable.   Linear fibrotic changes, scarring right apex.  No focal infiltrate or consolidation.  No pleural effusion.  No  pneumothorax.  Flattening both diaphragms and increase in the retrosternal  airspace indicating air trapping, COPD.  Impression: CONCLUSION: Flattening both diaphragms and increase in the  retrosternal airspace indicating air trapping, COPD.    Electronically signed by:  Madi Guardado MD  5/30/2019 2:12 PM CDT  Workstation: [id number]    @[email or web address]@    There is no immunization history on file for this patient.    The following portions of the patient's history were reviewed and updated as appropriate: allergies, current medications, past family history, past medical history, past social history, past surgical history and problem list.        Physical Exam  /60   Pulse 74   Temp 98.6 °F (37 °C)   Ht 165.1 cm (65\")   Wt 67.3 kg (148 lb 4.8 oz)   SpO2 93%   BMI 24.68 kg/m²     Physical Exam   Constitutional: He is oriented to person, place, and time. He appears well-developed and well-nourished.   HENT:   Head: Normocephalic and atraumatic.   Right Ear: External ear normal.   Eyes: Conjunctivae and EOM are normal. Pupils are equal, round, and reactive to light.   Neck: Normal range of motion. Neck supple.   Cardiovascular: Normal rate, regular rhythm and normal heart " sounds.   No murmur heard.  Pulmonary/Chest: No respiratory distress.   Decreased breath sounds     Coarse breath sounds    Abdominal: Soft. Bowel sounds are normal. He exhibits no distension. There is no tenderness.   Musculoskeletal: He exhibits tenderness. He exhibits no edema or deformity.        Right shoulder: He exhibits decreased range of motion, tenderness, bony tenderness, pain and spasm.        Lumbar back: He exhibits decreased range of motion, tenderness, bony tenderness, pain and spasm.   Gait instability get up and go test >10 seconds     No edema of legs    Neurological: He is alert and oriented to person, place, and time. No cranial nerve deficit.   Skin: Skin is warm. No rash noted. He is not diaphoretic. No erythema. No pallor.   Psychiatric: He has a normal mood and affect. His behavior is normal.   Nursing note and vitals reviewed.      Assessment/Plan    Diagnosis Plan   1. Hyponatremia  Basic metabolic panel    Osmolality, Serum    Osmolality, Urine - Urine, Clean Catch    Sodium, Urine, 24 Hour - Urine, Clean Catch   2. Tobacco abuse counseling     3. Tobacco user     4. Thrombocytopenia (CMS/HCC)     5. Steatohepatitis, alcoholic     6. Normocytic anemia     7. Hyperlipidemia, unspecified hyperlipidemia type     8. Gait instability     9. Essential hypertension     10. Alcohol abuse counseling and surveillance     11. Alcohol withdrawal syndrome with complication (CMS/HCC)     12. Acute on chronic respiratory failure with hypoxia (CMS/HCC)     13. DDD (degenerative disc disease), lumbar     14. Elevated d-dimer     15. Superficial thrombophlebitis of left upper extremity          -reviewed last [facility] ER reprot   -anemia/thrombocytopenia- pt has seen Dr. Serrano.  Had labwork done. Last plaetlet count normal   -counseled pt to quit smoking >5 minutes. Pt has cut down. He is using nicotine patches  -D-dimer elevated. Pt's oxygenation is at 99%.   CT of chest with contrast showed no evidience of  DVT upper venous doppler negative for DVT.      - will set up [facility] for COPD management. Medication management.  -gait instability/high fall risk - gave prescription for motorized wheelchair. He would benefit with wheelchair since pt is a high fall risk.    -hyponatremia -  Last sodium was at 126.  sodium osmalitiy, urine osmolality, urine sodium and bmp.  Possible SIADH. Pt currently euvolemic does not drink a lot of water.   -recommend colonoscopy screening  Pt declined. He was referred.    -HLP - lipitor 20 mg PO qhs   -counseled pt to cut [place] n on alcohol >5 minutes. He declines nicotine patch, wellbutrin and chantis   -thrombocytopenia - likely due to liver disease -recommend  Hematology referral with Dr. Serrano . He missed his last appt.   -alcohol abuse/fatty liver - recommend Gastroenterology referral  With RIGOBERTO Saini. He has not had anything to drink in 2 weeks. He is having DTs.  Will give prescription for librium tapering dose   -back pain - recommend x-ray of back   - COPD Pulmonologist and is currently on [employer] and [employer] oxygen stable   -chest pain/abnormal EKG - pt referred to cardiology. Pt has appt Friday with Dr. Malhotra.    -HTN -stopped lisinopril-HCTZ . Pt is on lisionpril 20 mg dialy  BP is still elevated will start on toprol XL 50 mgdaily.    -recommend aspirin 81 mg daily but he is intoleratn to aspirin   -GERD - protonix 40 mg PO q daily. Possible need EGD and colonoscopy screening  -recheck in 4-6 weeks  -advised to go to ER or call 911 if symptoms worrisome or svere  -since pt signed AMA today. Advised to be safe and proceed to ER or call 911 if symptoms severe         This document has been electronically signed by Toby Harrell MD on August 23, 2019 11:26 AM

## 2019-08-23 ENCOUNTER — OFFICE VISIT (OUTPATIENT)
Dept: FAMILY MEDICINE CLINIC | Facility: CLINIC | Age: 64
End: 2019-08-23

## 2019-08-23 ENCOUNTER — LAB (OUTPATIENT)
Dept: LAB | Facility: HOSPITAL | Age: 64
End: 2019-08-23

## 2019-08-23 VITALS
WEIGHT: 148.3 LBS | OXYGEN SATURATION: 93 % | HEART RATE: 74 BPM | DIASTOLIC BLOOD PRESSURE: 60 MMHG | HEIGHT: 65 IN | BODY MASS INDEX: 24.71 KG/M2 | TEMPERATURE: 98.6 F | SYSTOLIC BLOOD PRESSURE: 120 MMHG

## 2019-08-23 DIAGNOSIS — I80.8 SUPERFICIAL THROMBOPHLEBITIS OF LEFT UPPER EXTREMITY: ICD-10-CM

## 2019-08-23 DIAGNOSIS — Z71.6 TOBACCO ABUSE COUNSELING: ICD-10-CM

## 2019-08-23 DIAGNOSIS — I10 ESSENTIAL HYPERTENSION: ICD-10-CM

## 2019-08-23 DIAGNOSIS — R79.89 ELEVATED D-DIMER: ICD-10-CM

## 2019-08-23 DIAGNOSIS — D69.6 THROMBOCYTOPENIA (HCC): ICD-10-CM

## 2019-08-23 DIAGNOSIS — E78.5 HYPERLIPIDEMIA, UNSPECIFIED HYPERLIPIDEMIA TYPE: ICD-10-CM

## 2019-08-23 DIAGNOSIS — J96.21 ACUTE ON CHRONIC RESPIRATORY FAILURE WITH HYPOXIA (HCC): ICD-10-CM

## 2019-08-23 DIAGNOSIS — Z72.0 TOBACCO USER: ICD-10-CM

## 2019-08-23 DIAGNOSIS — R26.81 GAIT INSTABILITY: ICD-10-CM

## 2019-08-23 DIAGNOSIS — K70.10 STEATOHEPATITIS, ALCOHOLIC: ICD-10-CM

## 2019-08-23 DIAGNOSIS — Z71.41 ALCOHOL ABUSE COUNSELING AND SURVEILLANCE: ICD-10-CM

## 2019-08-23 DIAGNOSIS — D64.9 NORMOCYTIC ANEMIA: ICD-10-CM

## 2019-08-23 DIAGNOSIS — F10.939 ALCOHOL WITHDRAWAL SYNDROME WITH COMPLICATION (HCC): ICD-10-CM

## 2019-08-23 DIAGNOSIS — E87.1 HYPONATREMIA: ICD-10-CM

## 2019-08-23 DIAGNOSIS — M51.36 DDD (DEGENERATIVE DISC DISEASE), LUMBAR: ICD-10-CM

## 2019-08-23 DIAGNOSIS — E87.1 HYPONATREMIA: Primary | ICD-10-CM

## 2019-08-23 PROCEDURE — 99214 OFFICE O/P EST MOD 30 MIN: CPT | Performed by: FAMILY MEDICINE

## 2019-08-29 DIAGNOSIS — R06.09 DYSPNEA ON EXERTION: ICD-10-CM

## 2019-08-29 DIAGNOSIS — R79.89 ELEVATED D-DIMER: ICD-10-CM

## 2019-10-06 NOTE — PROGRESS NOTES
Subjective:  Khadar Dent is a 64 y.o. male who presents for       Patient Active Problem List   Diagnosis   • Annual physical exam   • General medical examination   • Encounter for tobacco use cessation counseling   • Encounter for screening for cardiovascular disorders   • Encounter for screening for endocrine disorder   • Chronic obstructive pulmonary disease (CMS/HCC)   • Tobacco abuse counseling   • Tobacco user   • Dyspnea   • Back pain   • Elevated blood pressure reading   • Alcohol abuse counseling and surveillance   • Alcohol abuse   • Tachycardia   • Chest pain   • Essential hypertension   • Thrombocytopenia (CMS/HCC)   • Hyperlipidemia   • Steatohepatitis, alcoholic   • Enteritis   • Nausea and vomiting   • Acute exacerbation of chronic obstructive pulmonary disease (COPD) (CMS/HCC)   • Acute on chronic respiratory failure with hypoxia (CMS/HCC)   • At high risk for falls   • Gait instability   • DDD (degenerative disc disease), lumbar   • Chronic low back pain with sciatica   • Normocytic anemia   • Elevated d-dimer   • Alcohol withdrawal syndrome with complication (CMS/HCC)   • Superficial thrombophlebitis of left upper extremity   • Hyponatremia   • Hypochloremia   • Head trauma   • Right knee pain   • Fall   • Right elbow pain           Current Outpatient Medications:   •  albuterol (ACCUNEB) 1.25 MG/3ML nebulizer solution, Take 3 mL by nebulization Every 6 (Six) Hours As Needed for Wheezing., Disp: 1 vial, Rfl: 3  •  albuterol sulfate  (90 Base) MCG/ACT inhaler, Inhale 2 puffs Every 6 (Six) Hours As Needed for Wheezing., Disp: 1 inhaler, Rfl: 3  •  atorvastatin (LIPITOR) 20 MG tablet, Take 1 tablet by mouth Daily., Disp: 30 tablet, Rfl: 3  •  chlordiazePOXIDE (LIBRIUM) 25 MG capsule, Take 4 pills daily for 1 week 3 pills daily for 2nd week 2 pills daily for 3rd week and 1 pill daily for 4th week, Disp: 70 capsule, Rfl: 0  •  cyclobenzaprine (FLEXERIL) 5 MG tablet, Take 1 tablet by mouth  3 (Three) Times a Day As Needed for Muscle Spasms., Disp: 90 tablet, Rfl: 2  •  fluticasone-salmeterol (WIXELA INHUB) 500-50 MCG/DOSE DISKUS, Inhale 1 puff 2 (Two) Times a Day., Disp: 60 each, Rfl: 3  •  INCRUSE ELLIPTA 62.5 MCG/INH aerosol powder , Inhale 1 puff Daily., Disp: 30 each, Rfl: 3  •  lisinopril (PRINIVIL,ZESTRIL) 20 MG tablet, Take 1 tablet by mouth Daily., Disp: 30 tablet, Rfl: 3  •  meloxicam (MOBIC) 15 MG tablet, Take 1 tablet by mouth Daily., Disp: 30 tablet, Rfl: 3  •  metoprolol succinate XL (TOPROL XL) 25 MG 24 hr tablet, Take 1 tablet by mouth Daily., Disp: 30 tablet, Rfl: 3  •  nicotine (NICODERM CQ) 21 MG/24HR patch, Place 1 patch on the skin as directed by provider Daily., Disp: 30 patch, Rfl: 3  •  ondansetron ODT (ZOFRAN ODT) 8 MG disintegrating tablet, Take 1 tablet by mouth Every 8 (Eight) Hours As Needed for Nausea or Vomiting., Disp: 90 tablet, Rfl: 3  •  pantoprazole (PROTONIX) 40 MG EC tablet, Take 1 tablet by mouth Daily., Disp: 30 tablet, Rfl: 3  •  sodium chloride 1 g tablet, Take 1 tablet by mouth 3 (Three) Times a Day., Disp: 90 tablet, Rfl: 3    Current Facility-Administered Medications:   •  methylPREDNISolone sodium succinate (SOLU-Medrol) injection 125 mg, 125 mg, Intravenous, Q6H, Toby Harrell MD, 125 mg at 05/22/19 1020           Pt is 65 yo male with CMH of COPD, chronic hypoxic respiratory failure, chronic bronchitis  GERD, HTN, HLP, thrombocytopenia, alcoholic fatty liver disease, alcohol abuse, tobacco user,  Gait instability, high risk for falls, superficial thrombophlebitis of left upper extremity, chronic back pain (DDD of lumbar spine, hyponatremia, hypochloremia,  History of enteritis,multiple joint pain      8/23/19 pt is her for recheck and followup. He went ot [facility] and had  Upper venous duplex that showed supeficial thrombplebitis but no upper DVT. He was sent to [facility] on 8/15/19 and had CT of chest with contrast that showed no DVT  He had stable  chornic changes and area of peripheral consolidation in  Right lower lobe  On  CBC his  Hemoglobin was at 11.9 with HCT at 34.6.  Platelet count was at 144 troponin was negative  D-dimer negative.   He saw Pulmonologist yesterday He did not change  Medicatins. He continues to take wixhela and albuterol inhaler. He smokes 3-4 cigaretttes a day. He continues to drink alcohol   Sodium is at 126 with urine osmolality at 241.   He is euvolemic. No history of CHF  He does not drink a a lot of water     10/11/19 pt is here for recheck and followup. Have last  Pulmonology note from 8/22/19 pt continues to take wixhela and  incruise for his COPD,chronic bronchitis. He recently  Fell off the side of a bridge trying to go fishing. He hurt his lower back/head/right eblow/right leg.  He went to [facility] ER. Per patient no x-ray were ordered.   Breathing is fair. He continues to smoke 1 ppd.  He is still drinking alcohol about 2-3 a day.  He is needing his inhalers    Fall   The accident occurred 3 to 5 days ago. The fall occurred in unknown circumstances. The point of impact was the head, right elbow, right knee and buttocks. The pain is present in the head, right elbow and right knee (lower back ). The pain is at a severity of 6/10. The pain is moderate. Associated symptoms include numbness. Pertinent negatives include no abdominal pain, bowel incontinence, fever, headaches, hearing loss, hematuria, loss of consciousness, nausea, tingling, visual change or vomiting. He has tried nothing (marijuana ) for the symptoms. The treatment provided no relief.   Shortness of Breath   This is a chronic problem. The current episode started more than 1 year ago. The problem occurs constantly. Associated symptoms include abdominal pain and chest pain. Pertinent negatives include no claudication, coryza, ear pain, fever, headaches, hemoptysis, leg pain, leg swelling, neck pain, orthopnea, PND, rash, rhinorrhea, sore throat, sputum  production, syncope, vomiting or wheezing. Nothing aggravates the symptoms. Risk factors include smoking. He has tried beta agonist inhalers, oral steroids and steroid inhalers for the symptoms. The treatment provided moderate relief. His past medical history is significant for allergies, aspirin allergies, chronic lung disease and COPD. There is no history of asthma, bronchiolitis, CAD, DVT, a heart failure, PE, pneumonia or a recent surgery.    Abdominal Pain   This is a chronic problem. The current episode started more than 1 year ago. The onset quality is gradual. The problem has been gradually worsening. The pain is at a severity of 4/10. The pain is moderate. The quality of the pain is aching. The abdominal pain does not radiate. Associated symptoms include headaches. Pertinent negatives include no anorexia, arthralgias, belching, constipation, diarrhea, dysuria, fever, flatus, hematochezia, hematuria, melena, myalgias, nausea, vomiting or weight loss. The pain is aggravated by bowel movement and deep breathing. The pain is relieved by nothing. He has tried nothing for the symptoms. Prior diagnostic workup includes CT scan and upper endoscopy. His past medical history is significant for GERD. There is no history of abdominal surgery, colon cancer, Crohn's disease, gallstones, pancreatitis, PUD or ulcerative colitis.    Chest Pain    The current episode started more than 1 year ago. The onset quality is gradual. The problem occurs intermittently. The problem has been waxing and waning. The pain is present in the substernal region and lateral region. The pain is at a severity of 5/10. The pain is moderate. The quality of the pain is described as numbness, pressure and tightness. Associated symptoms include back pain, dizziness, exertional chest pressure, headaches, numbness, palpitations, shortness of breath and weakness. Pertinent negatives include no abdominal  pain, claudication, cough, diaphoresis, fever, hemoptysis, irregular heartbeat, leg pain, lower extremity edema, malaise/fatigue, nausea, near-syncope, orthopnea, PND, sputum production, syncope or vomiting. The pain is aggravated by breathing, coughing, deep breathing, movement, walking and exertion. The treatment provided no relief. Risk factors include alcohol intake, being elderly, lack of exercise, smoking/tobacco exposure, sedentary lifestyle and stress.   His past medical history is significant for hypertension.   Pertinent negatives for past medical history include no aneurysm, no anxiety/panic attacks, no aortic aneurysm, no aortic dissection, no arrhythmia, no bicuspid aortic valve, no CAD, no cancer, no congenital heart disease, no connective tissue disease, no COPD, no CHF, no diabetes, no DVT, no hyperhomocysteinemia, no hyperlipidemia, no Kawasaki disease, no Marfan's syndrome, no MI, no mitral valve prolapse, no pacemaker, no PE, no PVD, no recent injury, no rheumatic fever, no seizures, no sickle cell disease, no sleep apnea, no spontaneous pneumothorax, no stimulant use, no strokes, no thyroid problem, no TIA, Shin syndrome and no valve disorder.   Hypertension   This is a chronic problem. The current episode started more than 1 year ago. The problem has been waxing and waning since onset. The problem is uncontrolled. Associated symptoms include chest pain, headaches, neck pain, palpitations and shortness of breath. Pertinent negatives include no anxiety, malaise/fatigue, orthopnea, peripheral edema, PND or sweats. Risk factors for coronary artery disease include male gender, sedentary lifestyle, smoking/tobacco exposure and dyslipidemia. Past treatments include ACE inhibitors and diuretics. Current antihypertension treatment includes ACE inhibitors and diuretics. The current treatment provides no improvement. There are no compliance problems.  There is no history of angina, kidney  disease, CAD/MI, CVA, heart failure, left ventricular hypertrophy, PVD or retinopathy. There is no history of chronic renal disease, coarctation of the aorta, hyperaldosteronism, hypercortisolism, hyperparathyroidism, a hypertension causing med, pheochromocytoma, renovascular disease, sleep apnea or a thyroid problem.   Back Pain   This is a chronic problem. The current episode started more than 1 year ago. The problem occurs constantly. The problem is unchanged. The pain is present in the lumbar spine and sacro-iliac. The quality of the pain is described as aching. Stiffness is present all day. Associated symptoms include chest pain, headaches, numbness and weakness. Pertinent negatives include no abdominal pain, bladder incontinence, bowel incontinence, dysuria, fever, leg pain, paresis, paresthesias, pelvic pain, perianal numbness, tingling or weight loss. The treatment provided no relief.   Alcohol Problem   This is a chronic problem. The current episode started more than 1 year ago. The problem occurs constantly. The problem has been unchanged. Associated symptoms include arthralgias, chest pain, fatigue, headaches, joint swelling, myalgias, neck pain, numbness and weakness. Pertinent negatives include no abdominal pain, anorexia, change in bowel habit, chills, congestion, coughing, diaphoresis, fever, nausea, rash, sore throat, swollen glands, urinary symptoms, vertigo, visual change or vomiting. Nothing aggravates the symptoms. He has tried nothing for the symptoms. The treatment provided no relief.    COPD   This is a new problem. The current episode started more than 1 year ago. The problem occurs constantly. Associated symptoms include arthralgias, chest pain, fatigue, headaches and weakness. Pertinent negatives include no abdominal pain, anorexia, change in bowel habit, chills, congestion, coughing, diaphoresis, fever, joint swelling, myalgias, nausea, neck pain or numbness    Review of Systems  Review of  Systems   Constitutional: Positive for activity change. Negative for appetite change, chills, diaphoresis, fatigue and fever.   HENT: Negative for congestion, postnasal drip, rhinorrhea, sinus pressure, sinus pain, sneezing, sore throat, trouble swallowing and voice change.    Respiratory: Positive for cough and shortness of breath. Negative for choking, chest tightness, wheezing and stridor.    Cardiovascular: Negative for chest pain.   Gastrointestinal: Negative for abdominal pain, bowel incontinence, diarrhea, nausea and vomiting.   Genitourinary: Negative for hematuria.   Musculoskeletal: Positive for arthralgias, back pain, gait problem, joint swelling, myalgias, neck pain and neck stiffness.   Neurological: Positive for weakness and numbness. Negative for tingling, loss of consciousness and headaches.       Patient Active Problem List   Diagnosis   • Annual physical exam   • General medical examination   • Encounter for tobacco use cessation counseling   • Encounter for screening for cardiovascular disorders   • Encounter for screening for endocrine disorder   • Chronic obstructive pulmonary disease (CMS/HCC)   • Tobacco abuse counseling   • Tobacco user   • Dyspnea   • Back pain   • Elevated blood pressure reading   • Alcohol abuse counseling and surveillance   • Alcohol abuse   • Tachycardia   • Chest pain   • Essential hypertension   • Thrombocytopenia (CMS/HCC)   • Hyperlipidemia   • Steatohepatitis, alcoholic   • Enteritis   • Nausea and vomiting   • Acute exacerbation of chronic obstructive pulmonary disease (COPD) (CMS/HCC)   • Acute on chronic respiratory failure with hypoxia (CMS/HCC)   • At high risk for falls   • Gait instability   • DDD (degenerative disc disease), lumbar   • Chronic low back pain with sciatica   • Normocytic anemia   • Elevated d-dimer   • Alcohol withdrawal syndrome with complication (CMS/HCC)   • Superficial thrombophlebitis of left upper extremity   • Hyponatremia   •  "Hypochloremia   • Head trauma   • Right knee pain   • Fall   • Right elbow pain     Past Surgical History:   Procedure Laterality Date   • BACK SURGERY     • CATARACT EXTRACTION     • COLONOSCOPY      pt states \"been a while back\" polyps found   • SHOULDER SURGERY       Social History     Socioeconomic History   • Marital status:      Spouse name: Not on file   • Number of children: Not on file   • Years of education: Not on file   • Highest education level: Not on file   Tobacco Use   • Smoking status: Current Every Day Smoker   • Smokeless tobacco: Never Used   Substance and Sexual Activity   • Alcohol use: Yes     Frequency: 4 or more times a week     Drinks per session: 1 or 2     Family History   Problem Relation Age of Onset   • Heart disease Mother    • Stroke Mother    • Stroke Father    • Heart disease Father    • Heart disease Brother      Lab Requisition on 07/26/2019   Component Date Value Ref Range Status   • Glucose 07/26/2019 103* 65 - 99 mg/dL Final   • BUN 07/26/2019 12  8 - 23 mg/dL Final   • Creatinine 07/26/2019 0.77  0.76 - 1.27 mg/dL Final   • Sodium 07/26/2019 135* 136 - 145 mmol/L Final   • Potassium 07/26/2019 4.3  3.5 - 5.2 mmol/L Final   • Chloride 07/26/2019 95* 98 - 107 mmol/L Final   • CO2 07/26/2019 24.0  22.0 - 29.0 mmol/L Final   • Calcium 07/26/2019 9.7  8.6 - 10.5 mg/dL Final   • Total Protein 07/26/2019 8.0  6.0 - 8.5 g/dL Final   • Albumin 07/26/2019 4.90  3.50 - 5.20 g/dL Final   • ALT (SGPT) 07/26/2019 11  1 - 41 U/L Final   • AST (SGOT) 07/26/2019 17  1 - 40 U/L Final   • Alkaline Phosphatase 07/26/2019 71  39 - 117 U/L Final   • Total Bilirubin 07/26/2019 0.4  0.2 - 1.2 mg/dL Final   • eGFR Non African Amer 07/26/2019 102  >60 mL/min/1.73 Final   • Globulin 07/26/2019 3.1  gm/dL Final   • A/G Ratio 07/26/2019 1.6  g/dL Final   • BUN/Creatinine Ratio 07/26/2019 15.6  7.0 - 25.0 Final   • Anion Gap 07/26/2019 16.0* 5.0 - 15.0 mmol/L Final   Lab Requisition on 07/23/2019 "   Component Date Value Ref Range Status   • WBC 07/23/2019 12.53* 3.40 - 10.80 10*3/mm3 Final   • RBC 07/23/2019 3.61* 4.14 - 5.80 10*6/mm3 Final   • Hemoglobin 07/23/2019 11.3* 13.0 - 17.7 g/dL Final   • Hematocrit 07/23/2019 33.1* 37.5 - 51.0 % Final   • MCV 07/23/2019 91.7  79.0 - 97.0 fL Final   • MCH 07/23/2019 31.3  26.6 - 33.0 pg Final   • MCHC 07/23/2019 34.1  31.5 - 35.7 g/dL Final   • RDW 07/23/2019 15.6* 12.3 - 15.4 % Final   • RDW-SD 07/23/2019 52.5  37.0 - 54.0 fl Final   • MPV 07/23/2019 11.1  6.0 - 12.0 fL Final   • Platelets 07/23/2019 257  140 - 450 10*3/mm3 Final   • Neutrophil % 07/23/2019 73.1  42.7 - 76.0 % Final   • Lymphocyte % 07/23/2019 11.1* 19.6 - 45.3 % Final   • Monocyte % 07/23/2019 11.7  5.0 - 12.0 % Final   • Eosinophil % 07/23/2019 3.0  0.3 - 6.2 % Final   • Basophil % 07/23/2019 0.5  0.0 - 1.5 % Final   • Immature Grans % 07/23/2019 0.6* 0.0 - 0.5 % Final   • Neutrophils, Absolute 07/23/2019 9.16* 1.70 - 7.00 10*3/mm3 Final   • Lymphocytes, Absolute 07/23/2019 1.39  0.70 - 3.10 10*3/mm3 Final   • Monocytes, Absolute 07/23/2019 1.46* 0.10 - 0.90 10*3/mm3 Final   • Eosinophils, Absolute 07/23/2019 0.38  0.00 - 0.40 10*3/mm3 Final   • Basophils, Absolute 07/23/2019 0.06  0.00 - 0.20 10*3/mm3 Final   • Immature Grans, Absolute 07/23/2019 0.08* 0.00 - 0.05 10*3/mm3 Final   • nRBC 07/23/2019 0.0  0.0 - 0.2 /100 WBC Final   Lab on 07/10/2019   Component Date Value Ref Range Status   • Folate 07/10/2019 18.60  4.78 - 24.20 ng/mL Final   • Performed by: 07/10/2019 Wes Peralta MD   Final   • Pathologist Interpretation 07/10/2019    Final                    Value:Slight normocytic normochromic anemia without schistocytes, tear drop cells, inclusions, microcytes, normoblasts or rouleaux.  Platelet count 215 (normal, CBC 7/10/2019).  No platelet satellitism.  Rare megathrombocytes present.  White cell count 5.38 (normal, CBC 7/10/2019).  Left shift or myeloblasts not identified.   •  Hepatitis B Surface Ag 07/10/2019 Non-Reactive  Non-Reactive Final   • Hep A IgM 07/10/2019 Non-Reactive  Non-Reactive Final   • Hep B C IgM 07/10/2019 Non-Reactive  Non-Reactive Final   • Hepatitis C Ab 07/10/2019 Non-Reactive  Non-Reactive Final   • Vitamin B-12 07/10/2019 480  211 - 946 pg/mL Final   • D-Dimer, Quantitative 07/10/2019 1,052* 0 - 470 ng/mL (FEU) Final   • WBC 07/10/2019 5.38  3.40 - 10.80 10*3/mm3 Final   • RBC 07/10/2019 3.51* 4.14 - 5.80 10*6/mm3 Final   • Hemoglobin 07/10/2019 10.5* 13.0 - 17.7 g/dL Final   • Hematocrit 07/10/2019 30.1* 37.5 - 51.0 % Final   • MCV 07/10/2019 85.8  79.0 - 97.0 fL Final   • MCH 07/10/2019 29.9  26.6 - 33.0 pg Final   • MCHC 07/10/2019 34.9  31.5 - 35.7 g/dL Final   • RDW 07/10/2019 13.8  12.3 - 15.4 % Final   • RDW-SD 07/10/2019 43.0  37.0 - 54.0 fl Final   • MPV 07/10/2019 9.7  6.0 - 12.0 fL Final   • Platelets 07/10/2019 215  140 - 450 10*3/mm3 Final   • Neutrophil % 07/10/2019 60.2  42.7 - 76.0 % Final   • Lymphocyte % 07/10/2019 24.7  19.6 - 45.3 % Final   • Monocyte % 07/10/2019 11.0  5.0 - 12.0 % Final   • Eosinophil % 07/10/2019 2.6  0.3 - 6.2 % Final   • Basophil % 07/10/2019 1.1  0.0 - 1.5 % Final   • Immature Grans % 07/10/2019 0.4  0.0 - 0.5 % Final   • Neutrophils, Absolute 07/10/2019 3.24  1.70 - 7.00 10*3/mm3 Final   • Lymphocytes, Absolute 07/10/2019 1.33  0.70 - 3.10 10*3/mm3 Final   • Monocytes, Absolute 07/10/2019 0.59  0.10 - 0.90 10*3/mm3 Final   • Eosinophils, Absolute 07/10/2019 0.14  0.00 - 0.40 10*3/mm3 Final   • Basophils, Absolute 07/10/2019 0.06  0.00 - 0.20 10*3/mm3 Final   • Immature Grans, Absolute 07/10/2019 0.02  0.00 - 0.05 10*3/mm3 Final   • nRBC 07/10/2019 0.0  0.0 - 0.2 /100 WBC Final      XR Spine Lumbar AP & Lateral  Narrative: PROCEDURE: Lumbar spine 3 views    REASON FOR EXAM: back pain, M54.9 Dorsalgia, unspecified    FINDINGS: . Lumbar spine vertebral body heights and alignment are  normal. There is no evidence of  "fracture or dislocation. Moderate  to severe loss of L5-S1 intervertebral disc space height with  associated anterior osteophytosis consistent with degenerative  disc disease. Otherwise intervertebral disc spaces are intact.  Impression: 1.Moderate to severe loss of L5-S1 intervertebral disc space  height with associated anterior osteophytosis consistent with  degenerative disc disease.   2. No acute lumbar spine abnormality..    Electronically signed by:  Manjeet Hughes MD  5/30/2019 2:31 PM CDT  Workstation: [id number]  XR Chest PA & Lateral  Narrative: Chest PA and lateral     CLINICAL INDICATION: Shortness of breath    COMPARISON: None    FINDINGS: Cardiac silhouette is normal in size. Pulmonary  vascularity is unremarkable.   Linear fibrotic changes, scarring right apex.  No focal infiltrate or consolidation.  No pleural effusion.  No  pneumothorax.  Flattening both diaphragms and increase in the retrosternal  airspace indicating air trapping, COPD.  Impression: CONCLUSION: Flattening both diaphragms and increase in the  retrosternal airspace indicating air trapping, COPD.    Electronically signed by:  Madi Guardado MD  5/30/2019 2:12 PM CDT  Workstation: [id number]    @[email or web address]@    There is no immunization history on file for this patient.    The following portions of the patient's history were reviewed and updated as appropriate: allergies, current medications, past family history, past medical history, past social history, past surgical history and problem list.        Physical Exam  /90 (BP Location: Left arm, Patient Position: Sitting, Cuff Size: Adult)   Pulse 80   Temp 97.6 °F (36.4 °C) (Oral)   Resp 16   Ht 165.1 cm (65\")   Wt 68.2 kg (150 lb 4 oz)   SpO2 96%   BMI 25.00 kg/m²     Physical Exam   Constitutional: He is oriented to person, place, and time. He appears well-developed and well-nourished.   HENT:   Head: Normocephalic and atraumatic.       Right Ear: External ear normal.   Eyes: " Conjunctivae and EOM are normal. Pupils are equal, round, and reactive to light.   Neck: Normal range of motion. Neck supple.   Cardiovascular: Normal rate, regular rhythm and normal heart sounds.   No murmur heard.  Pulmonary/Chest: No respiratory distress.   Decreased breath sounds    Abdominal: Soft. Bowel sounds are normal. He exhibits no distension. There is no tenderness.   Musculoskeletal: He exhibits tenderness. He exhibits no edema or deformity.        Right elbow: He exhibits decreased range of motion and swelling.        Right knee: He exhibits decreased range of motion and swelling. Tenderness found. Medial joint line and lateral joint line tenderness noted.        Lumbar back: He exhibits decreased range of motion, tenderness, bony tenderness, pain and spasm.   Gait instability/ get up and got test >10 seconds    Neurological: He is alert and oriented to person, place, and time. No cranial nerve deficit.   Skin: Skin is warm. No rash noted. He is not diaphoretic. No erythema. No pallor.   Psychiatric: He has a normal mood and affect. His behavior is normal.   Nursing note and vitals reviewed.      Assessment/Plan    Diagnosis Plan   1. Fall, initial encounter  XR Knee 3 View Right    XR elbow 3+ vw right    XR Skull Complete 4+ View (In Office)    XR Spine Lumbar 4+ View   2. Thrombocytopenia (CMS/HCC)     3. Tobacco user     4. Steatohepatitis, alcoholic     5. Hyperlipidemia, unspecified hyperlipidemia type     6. Essential hypertension     7. Gait instability     8. DDD (degenerative disc disease), lumbar     9. Chronic obstructive pulmonary disease, unspecified COPD type (CMS/HCC)  CBC Auto Differential    Comprehensive Metabolic Panel    Hemoglobin A1c    Lipid Panel    Vitamin D 25 Hydroxy   10. Chronic low back pain with sciatica, sciatica laterality unspecified, unspecified back pain laterality     11. At high risk for falls     12. Alcohol abuse counseling and surveillance     13. Acute on  chronic respiratory failure with hypoxia (CMS/HCC)     14. Hyponatremia     15. Hypochloremia     16. Right knee pain, unspecified chronicity     17. Traumatic injury of head, initial encounter     18. Right elbow pain        -recommend influenza vaccination  - pt declined   -recommend colonoscopy screening   -sp fall -will get x-ray of right elbow/knee/lower back and skull.  Recommend Tylenol or ibuprofen for pain.  He admits to marijuana use so no controlled narcotics . mobic 15 mg q daily for pain. Advised pt not to take with aspirin   -anemia/thrombocytopenia- pt has seen Hematologist   Had labwork done. Last plaetlet count normal   -tobacco user counseled pt to quit smoking >5 minutes. Pt has cut down. He is using nicotine patches  -D-dimer elevated. Pt's oxygenation is at 99%.   CT of chest with contrast showed no evidience of DVT upper venous doppler negative for DVT.      - will set up [facility] for COPD management. Medication management.  -gait instability/high fall risk - gave prescription for motorized wheelchair. He would benefit with wheelchair since pt is a high fall risk.    -hyponatremia -  Last sodium was at 126.  sodium osmalitiy, urine osmolality, urine sodium and bmp.  Possible SIADH. Pt currently euvolemic does not drink a lot of water.   -recommend colonoscopy screening  Pt declined. He was referred.    -HLP - lipitor 20 mg PO qhs   -counseled pt to cut [place] n on alcohol >5 minutes. He declines nicotine patch, wellbutrin and chantis   -thrombocytopenia - likely due to liver disease -recommend  Hematology referral with Dr. Serrano . He missed his last appt.   -alcohol abuse/fatty liver - recommend Gastroenterology referral  With RIGOEBRTO Saini. He has not had anything to drink in 2 weeks. He is having DTs.  Will give prescription for librium tapering dose   -back pain - recommend x-ray of back   - COPD Pulmonologist and is currently on wixela and inncruse. Advised to stop smoking >5  minutes. Refilled inhalers today.    -chest pain/abnormal EKG - pt referred to cardiology. Pt has appt Friday with Dr. Malhotra.    -HTN -stopped lisinopril-HCTZ . Pt is on lisionpril 20 mg daily. Continue on toprol XL 25 mg daily   -recommend aspirin 81 mg daily but he is intoleratn to aspirin   -GERD - protonix 40 mg PO q daily. Possible need EGD and colonoscopy screening  -recheck in 4-6 weeks  -advised to go to ER or call 911 if symptoms worrisome or svere  -since pt signed AMA today. Advised to be safe and proceed to ER or call 911 if symptoms severe         This document has been electronically signed by Toby Harrell MD on October 11, 2019 10:23 AM

## 2019-10-11 ENCOUNTER — LAB (OUTPATIENT)
Dept: LAB | Facility: HOSPITAL | Age: 64
End: 2019-10-11

## 2019-10-11 ENCOUNTER — OFFICE VISIT (OUTPATIENT)
Dept: FAMILY MEDICINE CLINIC | Facility: CLINIC | Age: 64
End: 2019-10-11

## 2019-10-11 VITALS
BODY MASS INDEX: 25.03 KG/M2 | HEIGHT: 65 IN | TEMPERATURE: 97.6 F | HEART RATE: 80 BPM | SYSTOLIC BLOOD PRESSURE: 116 MMHG | WEIGHT: 150.25 LBS | RESPIRATION RATE: 16 BRPM | DIASTOLIC BLOOD PRESSURE: 90 MMHG | OXYGEN SATURATION: 96 %

## 2019-10-11 DIAGNOSIS — M25.561 RIGHT KNEE PAIN, UNSPECIFIED CHRONICITY: ICD-10-CM

## 2019-10-11 DIAGNOSIS — Z71.41 ALCOHOL ABUSE COUNSELING AND SURVEILLANCE: ICD-10-CM

## 2019-10-11 DIAGNOSIS — Z91.81 AT HIGH RISK FOR FALLS: ICD-10-CM

## 2019-10-11 DIAGNOSIS — E78.5 HYPERLIPIDEMIA, UNSPECIFIED HYPERLIPIDEMIA TYPE: ICD-10-CM

## 2019-10-11 DIAGNOSIS — J44.9 CHRONIC OBSTRUCTIVE PULMONARY DISEASE, UNSPECIFIED COPD TYPE (HCC): ICD-10-CM

## 2019-10-11 DIAGNOSIS — R26.81 GAIT INSTABILITY: ICD-10-CM

## 2019-10-11 DIAGNOSIS — Z72.0 TOBACCO USER: ICD-10-CM
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
## 2019-10-11 DIAGNOSIS — S09.90XA TRAUMATIC INJURY OF HEAD, INITIAL ENCOUNTER: ICD-10-CM

## 2019-10-11 DIAGNOSIS — G89.29 CHRONIC LOW BACK PAIN WITH SCIATICA, SCIATICA LATERALITY UNSPECIFIED, UNSPECIFIED BACK PAIN LATERALITY: ICD-10-CM

## 2019-10-11 DIAGNOSIS — J96.21 ACUTE ON CHRONIC RESPIRATORY FAILURE WITH HYPOXIA (HCC): ICD-10-CM

## 2019-10-11 DIAGNOSIS — M54.40 CHRONIC LOW BACK PAIN WITH SCIATICA, SCIATICA LATERALITY UNSPECIFIED, UNSPECIFIED BACK PAIN LATERALITY: ICD-10-CM

## 2019-10-11 DIAGNOSIS — E87.8 HYPOCHLOREMIA: ICD-10-CM

## 2019-10-11 DIAGNOSIS — E87.1 HYPONATREMIA: ICD-10-CM

## 2019-10-11 DIAGNOSIS — D69.6 THROMBOCYTOPENIA (HCC): ICD-10-CM

## 2019-10-11 DIAGNOSIS — K70.10 STEATOHEPATITIS, ALCOHOLIC: ICD-10-CM

## 2019-10-11 DIAGNOSIS — I10 ESSENTIAL HYPERTENSION: ICD-10-CM

## 2019-10-11 DIAGNOSIS — M51.36 DDD (DEGENERATIVE DISC DISEASE), LUMBAR: ICD-10-CM

## 2019-10-11 DIAGNOSIS — M25.521 RIGHT ELBOW PAIN: ICD-10-CM

## 2019-10-11 LAB
25(OH)D3 SERPL-MCNC: 28.9 NG/ML (ref 30–100)
ALBUMIN SERPL-MCNC: 4.7 G/DL (ref 3.5–5.2)
ALBUMIN/GLOB SERPL: 1.6 G/DL
ALP SERPL-CCNC: 70 U/L (ref 39–117)
ALT SERPL W P-5'-P-CCNC: 33 U/L (ref 1–41)
ANION GAP SERPL CALCULATED.3IONS-SCNC: 14.4 MMOL/L (ref 5–15)
AST SERPL-CCNC: 59 U/L (ref 1–40)
BILIRUB SERPL-MCNC: 0.5 MG/DL (ref 0.2–1.2)
BUN BLD-MCNC: 15 MG/DL (ref 8–23)
BUN/CREAT SERPL: 13.9 (ref 7–25)
CALCIUM SPEC-SCNC: 8.9 MG/DL (ref 8.6–10.5)
CHLORIDE SERPL-SCNC: 91 MMOL/L (ref 98–107)
CHOLEST SERPL-MCNC: 125 MG/DL (ref 0–200)
CO2 SERPL-SCNC: 26.6 MMOL/L (ref 22–29)
CREAT BLD-MCNC: 1.08 MG/DL (ref 0.76–1.27)
DEPRECATED RDW RBC AUTO: 42.4 FL (ref 37–54)
EOSINOPHIL # BLD MANUAL: 0.19 10*3/MM3 (ref 0–0.4)
EOSINOPHIL NFR BLD MANUAL: 3 % (ref 0.3–6.2)
ERYTHROCYTE [DISTWIDTH] IN BLOOD BY AUTOMATED COUNT: 13.1 % (ref 12.3–15.4)
GFR SERPL CREATININE-BSD FRML MDRD: 69 ML/MIN/1.73
GLOBULIN UR ELPH-MCNC: 2.9 GM/DL
GLUCOSE BLD-MCNC: 80 MG/DL (ref 65–99)
HBA1C MFR BLD: 5.3 % (ref 4.8–5.6)
HCT VFR BLD AUTO: 35.8 % (ref 37.5–51)
HDLC SERPL-MCNC: 84 MG/DL (ref 40–60)
HGB BLD-MCNC: 12.2 G/DL (ref 13–17.7)
LDLC SERPL CALC-MCNC: 30 MG/DL (ref 0–100)
LDLC/HDLC SERPL: 0.36 {RATIO}
LYMPHOCYTES # BLD MANUAL: 2.51 10*3/MM3 (ref 0.7–3.1)
LYMPHOCYTES NFR BLD MANUAL: 39 % (ref 19.6–45.3)
LYMPHOCYTES NFR BLD MANUAL: 6 % (ref 5–12)
MCH RBC QN AUTO: 30.7 PG (ref 26.6–33)
MCHC RBC AUTO-ENTMCNC: 34.1 G/DL (ref 31.5–35.7)
MCV RBC AUTO: 89.9 FL (ref 79–97)
MONOCYTES # BLD AUTO: 0.39 10*3/MM3 (ref 0.1–0.9)
NEUTROPHILS # BLD AUTO: 3.34 10*3/MM3 (ref 1.7–7)
NEUTROPHILS NFR BLD MANUAL: 52 % (ref 42.7–76)
PLAT MORPH BLD: NORMAL
PLATELET # BLD AUTO: 141 10*3/MM3 (ref 140–450)
PMV BLD AUTO: 11.9 FL (ref 6–12)
POTASSIUM BLD-SCNC: 3.8 MMOL/L (ref 3.5–5.2)
PROT SERPL-MCNC: 7.6 G/DL (ref 6–8.5)
RBC # BLD AUTO: 3.98 10*6/MM3 (ref 4.14–5.8)
RBC MORPH BLD: NORMAL
SODIUM BLD-SCNC: 132 MMOL/L (ref 136–145)
TRIGL SERPL-MCNC: 55 MG/DL (ref 0–150)
VLDLC SERPL-MCNC: 11 MG/DL (ref 5–40)
WBC MORPH BLD: NORMAL
WBC NRBC COR # BLD: 6.43 10*3/MM3 (ref 3.4–10.8)

## 2019-10-11 PROCEDURE — 85007 BL SMEAR W/DIFF WBC COUNT: CPT

## 2019-10-11 PROCEDURE — 80061 LIPID PANEL: CPT

## 2019-10-11 PROCEDURE — 85025 COMPLETE CBC W/AUTO DIFF WBC: CPT

## 2019-10-11 PROCEDURE — 99214 OFFICE O/P EST MOD 30 MIN: CPT | Performed by: FAMILY MEDICINE

## 2019-10-11 PROCEDURE — 82306 VITAMIN D 25 HYDROXY: CPT

## 2019-10-11 PROCEDURE — 80053 COMPREHEN METABOLIC PANEL: CPT

## 2019-10-11 PROCEDURE — 83036 HEMOGLOBIN GLYCOSYLATED A1C: CPT

## 2019-10-11 RX ORDER — MELOXICAM 15 MG/1
15 TABLET ORAL DAILY
Qty: 30 TABLET | Refills: 3 | Status: SHIPPED | OUTPATIENT
Start: 2019-10-11 | End: 2020-08-26

## 2019-10-11 RX ORDER — METOPROLOL SUCCINATE 25 MG/1
25 TABLET, EXTENDED RELEASE ORAL DAILY
Qty: 30 TABLET | Refills: 3 | Status: SHIPPED | OUTPATIENT
Start: 2019-10-11 | End: 2020-04-27 | Stop reason: SDUPTHER

## 2019-10-11 RX ORDER — LISINOPRIL 20 MG/1
20 TABLET ORAL DAILY
Qty: 30 TABLET | Refills: 3 | Status: SHIPPED | OUTPATIENT
Start: 2019-10-11 | End: 2020-04-27 | Stop reason: SDUPTHER

## 2019-10-11 RX ORDER — ATORVASTATIN CALCIUM 20 MG/1
20 TABLET, FILM COATED ORAL DAILY
Qty: 30 TABLET | Refills: 3 | Status: SHIPPED | OUTPATIENT
Start: 2019-10-11 | End: 2020-04-27 | Stop reason: SDUPTHER

## 2019-10-11 RX ORDER — SODIUM CHLORIDE 1000 MG
1 TABLET, SOLUBLE MISCELLANEOUS 3 TIMES DAILY
Qty: 90 TABLET | Refills: 3 | Status: SHIPPED | OUTPATIENT
Start: 2019-10-11 | End: 2020-08-26

## 2019-10-11 RX ORDER — CYCLOBENZAPRINE HCL 5 MG
5 TABLET ORAL 3 TIMES DAILY PRN
Qty: 90 TABLET | Refills: 2 | Status: SHIPPED | OUTPATIENT
Start: 2019-10-11

## 2019-10-11 RX ORDER — PANTOPRAZOLE SODIUM 40 MG/1
40 TABLET, DELAYED RELEASE ORAL DAILY
Qty: 30 TABLET | Refills: 3 | Status: SHIPPED | OUTPATIENT
Start: 2019-10-11 | End: 2020-04-27 | Stop reason: SDUPTHER

## 2019-10-11 RX ORDER — ALBUTEROL SULFATE 90 UG/1
2 AEROSOL, METERED RESPIRATORY (INHALATION) EVERY 6 HOURS PRN
Qty: 1 INHALER | Refills: 3 | Status: SHIPPED | OUTPATIENT
Start: 2019-10-11 | End: 2019-12-17 | Stop reason: SDUPTHER

## 2019-10-11 RX ORDER — ALBUTEROL SULFATE 1.25 MG/3ML
1 SOLUTION RESPIRATORY (INHALATION) EVERY 6 HOURS PRN
Qty: 1 VIAL | Refills: 3 | Status: SHIPPED | OUTPATIENT
Start: 2019-10-11 | End: 2019-12-17 | Stop reason: SDUPTHER

## 2019-10-12 RX ORDER — ERGOCALCIFEROL 1.25 MG/1
50000 CAPSULE ORAL
Qty: 4 CAPSULE | Refills: 3 | Status: SHIPPED | OUTPATIENT
Start: 2019-10-12 | End: 2019-10-24 | Stop reason: SDUPTHER

## 2019-10-14 ENCOUNTER — TELEPHONE (OUTPATIENT)
Dept: FAMILY MEDICINE CLINIC | Facility: CLINIC | Age: 64
End: 2019-10-14

## 2019-10-14 NOTE — TELEPHONE ENCOUNTER
----- Message from Toby Harrell MD sent at 10/12/2019  1:12 PM CDT -----  Please call pt    Vitamin D continues to be low and I will send Vitamin D3 50,000 units to be taken once a week to pharmacy. Will give 4 pills and 3 refills      On CBC  Hemoglobin slightly improved from 11.3 to 12.2.  Continue with Hematology followup platelet count normal     On CMP his sodium and chloride continue to be be low and make sure pt is taking his sodium chloride tablets. Will need to get serum osmolality and urine osmolality on next visit     Lipid panel is stable     hga1c is normal. Pt is not diabetic or prediabetic.     Recheck on next visit    Pt's phone number is actually his friend's number if you try to reach him.    Thanks

## 2019-10-16 ENCOUNTER — TELEPHONE (OUTPATIENT)
Dept: FAMILY MEDICINE CLINIC | Facility: CLINIC | Age: 64
End: 2019-10-16

## 2019-10-21 ENCOUNTER — TELEPHONE (OUTPATIENT)
Dept: FAMILY MEDICINE CLINIC | Facility: CLINIC | Age: 64
End: 2019-10-21

## 2019-10-21 NOTE — TELEPHONE ENCOUNTER
----- Message from Toby Harrell MD sent at 10/12/2019 12:02 PM CDT -----  Please call pt    Pt has moderate to severe degenerative changes in right knee.  If pt would like referral to Orthopedics let me know and I will put in order    No acute fractures or dislocations    Recheck on next visit    Pt's phone number is actually his friend's number if you try to reach him.    Thanks

## 2019-10-22 ENCOUNTER — TELEPHONE (OUTPATIENT)
Dept: FAMILY MEDICINE CLINIC | Facility: CLINIC | Age: 64
End: 2019-10-22

## 2019-10-22 NOTE — TELEPHONE ENCOUNTER
Rebecca Cordoba from [facility] left a voicemail stating that they have been trying to get in touch with  since the 18th. She claims they are going to attempt again tomorrow, if they are not successful they will cancel referral.

## 2019-10-23 ENCOUNTER — TELEPHONE (OUTPATIENT)
Dept: FAMILY MEDICINE CLINIC | Facility: CLINIC | Age: 64
End: 2019-10-23

## 2019-10-23 NOTE — TELEPHONE ENCOUNTER
[facility] called regarding Mr. Dent. They have some concerns and would like a call back at [phone] please

## 2019-10-24 RX ORDER — LANOLIN ALCOHOL/MO/W.PET/CERES
100 CREAM (GRAM) TOPICAL DAILY
Qty: 30 TABLET | Refills: 3 | Status: SHIPPED | OUTPATIENT
Start: 2019-10-24

## 2019-10-24 RX ORDER — ERGOCALCIFEROL 1.25 MG/1
50000 CAPSULE ORAL
Qty: 4 CAPSULE | Refills: 3 | Status: SHIPPED | OUTPATIENT
Start: 2019-10-24

## 2019-11-04 PROBLEM — S42.101A CLOSED FRACTURE OF RIGHT SCAPULA: Status: ACTIVE | Noted: 2019-11-04
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
## 2019-11-07 ENCOUNTER — OFFICE VISIT (OUTPATIENT)
Dept: HEMATOLOGY/ONCOLOGY | Facility: CLINIC | Age: 64
End: 2019-11-07

## 2019-11-07 VITALS — OXYGEN SATURATION: 97 % | HEART RATE: 101 BPM | DIASTOLIC BLOOD PRESSURE: 73 MMHG | SYSTOLIC BLOOD PRESSURE: 126 MMHG

## 2019-11-07 DIAGNOSIS — F10.10 ALCOHOL ABUSE: ICD-10-CM

## 2019-11-07 DIAGNOSIS — D69.6 THROMBOCYTOPENIA (HCC): Primary | ICD-10-CM

## 2019-11-07 DIAGNOSIS — D64.9 NORMOCYTIC ANEMIA: ICD-10-CM

## 2019-11-07 DIAGNOSIS — Z71.6 ENCOUNTER FOR TOBACCO USE CESSATION COUNSELING: ICD-10-CM

## 2019-11-07 DIAGNOSIS — K70.10 STEATOHEPATITIS, ALCOHOLIC: ICD-10-CM

## 2019-11-07 PROCEDURE — 99213 OFFICE O/P EST LOW 20 MIN: CPT | Performed by: INTERNAL MEDICINE

## 2019-11-07 PROCEDURE — G0463 HOSPITAL OUTPT CLINIC VISIT: HCPCS | Performed by: INTERNAL MEDICINE

## 2019-11-07 PROCEDURE — 99406 BEHAV CHNG SMOKING 3-10 MIN: CPT | Performed by: INTERNAL MEDICINE

## 2019-11-10 NOTE — PROGRESS NOTES
Khadar Dent  [id number]  1955    Subjective     Mr Dent was seen in follow up for thrombocytopenia.   Recently sustained a fall.   Fortunately no major injuries.   Bruises easily. But denies any muscle hematoma or hemarthrosis.   No BRBPR or melena.   Continues to drink and smoke.   Counseled about smoking cessation.   Counseled about alcohol abuse.   ROS as follow.     Past Medical History, Past Surgical History, Social History, Family History have been reviewed and are without significant changes except as mentioned.    Review of Systems   CONSTITUTIONAL: fatigue + weakness + weight loss + No fever, chills.  HEENT: Eyes: No visual loss, blurred vision, double vision or yellow sclerae. Ears, Nose, Throat: No hearing loss, sneezing, congestion, runny nose or sore throat.  SKIN: No rash or itching.  CARDIOVASCULAR: chest pain + chronic . No palpitations or edema.  RESPIRATORY:cough + SOB +   GASTROINTESTINAL: anorexia + abdominal pain +. No nausea or emesis.   GENITOURINARY: Negative for urgency, frequency or dysuria.   NEUROLOGICAL: No headache, dizziness, syncope, paralysis, ataxia, numbness or tingling in the extremities. No change in bowel or bladder control.  MUSCULOSKELETAL: Chronic joint pain +   HEMATOLOGIC: No anemia, bleeding or bruising.  LYMPHATICS: No enlarged nodes. No history of splenectomy.  PSYCHIATRIC: No history of depression or anxiety.  ENDOCRINOLOGIC: No reports of sweating, cold or heat intolerance. No polyuria or polydipsia.  ALLERGIES: No history of asthma, hives, eczema or rhinitis.    Medications:  The current medication list was reviewed in the EMR    ALLERGIES:    Allergies   Allergen Reactions   • Aspirin Hives   • Codeine Hives   • Morphine Hives   • Penicillins Hives       Objective      Vitals:    11/07/19 1404   BP: 126/73   Pulse: 101   SpO2: 97%   PainSc: 10-Worst pain ever   PainLoc: Generalized     Current Status 7/10/2019   ECOG score 1       Physical Exam     GENERAL: Alert, awake, oriented.  Chronically ill looking male, disheveled. Poor hygiene. Appears much older than his actual age. Vitals as above.   HEAD: Normocephalic, atraumatic.   EYES: PERRL, EOMI. F vision is grossly intact.  NECK: Supple, no adenopathy or thyromegaly.   THROAT: Normal oral cavity and pharynx. No inflammation, swelling, exudate, or lesions.  CARDIAC: Normal S1 and S2. No S3, S4 or murmurs. Rhythm is regular.  Extremities are warm and well perfused.   LUNGS: Decreased air entry bilaterally, expiratory wheezes with prolongation of expiratory phase.  ABDOMEN: Positive bowel sounds. Soft, nondistended, nontender. No guarding or rebound. No masses.  BACK:  No bony tenderness.   EXTREMITIES: Diffuse osteoarthritis involving multiple joints +  Peripheral pulses intact. No varicosities.  SKIN: No rash or bruising. Multiple tattoos +   NEUROLOGICAL: Grossly non-focal exam. No focal weakness. Gait: Walks with cane.   PSYCH: Mood and affect normal. No hallucination or suicidal thoughts.   LYMPHATIC: No cervical, supraclavicular or axillary adenopathy.     RECENT LABS:Independently reviewed and summarized.  Hematology WBC   Date Value Ref Range Status   10/11/2019 6.43 3.40 - 10.80 10*3/mm3 Final     RBC   Date Value Ref Range Status   10/11/2019 3.98 (L) 4.14 - 5.80 10*6/mm3 Final     Hemoglobin   Date Value Ref Range Status   10/11/2019 12.2 (L) 13.0 - 17.7 g/dL Final     Hematocrit   Date Value Ref Range Status   10/11/2019 35.8 (L) 37.5 - 51.0 % Final     Platelets   Date Value Ref Range Status   10/11/2019 141 140 - 450 10*3/mm3 Final          Imaging (independently reviewed and summarized):   CT abdomen pelvis without contrast on March 10, 2019 showed fatty liver disease.  No hepato-splenomegaly or lymphadenopathy in abdomen.    [place] reports a pain score of 10.  Given his pain assessment as noted, treatment options were discussed and the following options were decided upon as a  follow-up plan to address the patient's pain: referral to Primary Care for assistance in pain treatment guidance.Patient is receiving pain medications from his PCP. He also drinks heavily. Referral to pain clinic offered, which he refused.     Patient is to return in 3 months for reassessment of his pain.    Diagnosis:   (1) Thrombocytopenia   (2) Anemia   (3) Steato-hepatitis, alcoholic   (4) Encounter for tobacco use cessation counseling       Assessment/Plan     (1) Thrombocytopenia     Improved today.   Patient with multiple issues including heavy alcohol abuse, tobacco abuse, COPD, osteoarthritis, chest pain and medication noncompliance.  He had routine laboratory testing performed in April 2019 which showed platelet count of 119,000.  Likely this was related to his alcohol abuse and alcoholic fatty liver disease.       (2) Anemia: Mild. Secondary to alcohol induce myelosuppression. Iron, b12 were checked and were normal.     (3) Steato-hepatitis, alcoholic: Patient reports drinking 6 packs of beer along with hard liquor every day.  Counseled extensively about harmful effects of alcohol.  He says that he is not interested in quitting at this point.  Rehab and social service consults were offered to the patient's in case if he changes his mind.    (4) Encounter for tobacco use cessation counseling   I had 5-7 minutes discussion with the patient about smoking cessation. Problems with continued smoking including respiratory, cardiovascular and oncological problems were discussion. Advice on smoking cessation was reiterated including methods of quitting and different medications and support system available for smoking cessation was discussed.     Zach Garcia MD             11/10/2019      CC:
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
## 2019-12-17 ENCOUNTER — OFFICE VISIT (OUTPATIENT)
Dept: FAMILY MEDICINE CLINIC | Facility: CLINIC | Age: 64
End: 2019-12-17

## 2019-12-17 ENCOUNTER — TELEPHONE (OUTPATIENT)
Dept: FAMILY MEDICINE CLINIC | Facility: CLINIC | Age: 64
End: 2019-12-17

## 2019-12-17 ENCOUNTER — LAB (OUTPATIENT)
Dept: LAB | Facility: HOSPITAL | Age: 64
End: 2019-12-17

## 2019-12-17 VITALS
TEMPERATURE: 97.5 F | BODY MASS INDEX: 24.66 KG/M2 | SYSTOLIC BLOOD PRESSURE: 132 MMHG | WEIGHT: 148 LBS | HEIGHT: 65 IN | DIASTOLIC BLOOD PRESSURE: 64 MMHG | OXYGEN SATURATION: 98 % | HEART RATE: 90 BPM

## 2019-12-17 DIAGNOSIS — E78.5 HYPERLIPIDEMIA, UNSPECIFIED HYPERLIPIDEMIA TYPE: ICD-10-CM

## 2019-12-17 DIAGNOSIS — M54.9 BACK PAIN, UNSPECIFIED BACK LOCATION, UNSPECIFIED BACK PAIN LATERALITY, UNSPECIFIED CHRONICITY: ICD-10-CM

## 2019-12-17 DIAGNOSIS — M51.36 DDD (DEGENERATIVE DISC DISEASE), LUMBAR: ICD-10-CM

## 2019-12-17 DIAGNOSIS — S42.101S CLOSED FRACTURE OF RIGHT SCAPULA, UNSPECIFIED PART OF SCAPULA, SEQUELA: ICD-10-CM

## 2019-12-17 DIAGNOSIS — E87.1 HYPONATREMIA: ICD-10-CM

## 2019-12-17 DIAGNOSIS — S09.90XA TRAUMATIC INJURY OF HEAD, INITIAL ENCOUNTER: ICD-10-CM

## 2019-12-17 DIAGNOSIS — R06.00 DYSPNEA, UNSPECIFIED TYPE: ICD-10-CM

## 2019-12-17 DIAGNOSIS — J44.1 COPD EXACERBATION (HCC): Primary | ICD-10-CM

## 2019-12-17 DIAGNOSIS — Z91.199 NONCOMPLIANCE: ICD-10-CM

## 2019-12-17 DIAGNOSIS — F10.10 ALCOHOL ABUSE: ICD-10-CM

## 2019-12-17 DIAGNOSIS — I10 ESSENTIAL HYPERTENSION: ICD-10-CM

## 2019-12-17 DIAGNOSIS — Z72.0 TOBACCO USER: ICD-10-CM
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
## 2019-12-17 DIAGNOSIS — J96.21 ACUTE ON CHRONIC RESPIRATORY FAILURE WITH HYPOXIA (HCC): ICD-10-CM

## 2019-12-17 DIAGNOSIS — D69.6 THROMBOCYTOPENIA (HCC): ICD-10-CM

## 2019-12-17 DIAGNOSIS — R26.81 GAIT INSTABILITY: ICD-10-CM

## 2019-12-17 DIAGNOSIS — K70.0 ALCOHOLIC FATTY LIVER: ICD-10-CM

## 2019-12-17 DIAGNOSIS — Z91.81 AT HIGH RISK FOR FALLS: ICD-10-CM

## 2019-12-17 DIAGNOSIS — J44.9 CHRONIC OBSTRUCTIVE PULMONARY DISEASE, UNSPECIFIED COPD TYPE (HCC): ICD-10-CM

## 2019-12-17 LAB
ALBUMIN SERPL-MCNC: 4.8 G/DL (ref 3.5–5.2)
ALBUMIN/GLOB SERPL: 1.6 G/DL
ALP SERPL-CCNC: 76 U/L (ref 39–117)
ALT SERPL W P-5'-P-CCNC: 22 U/L (ref 1–41)
ANION GAP SERPL CALCULATED.3IONS-SCNC: 14.3 MMOL/L (ref 5–15)
AST SERPL-CCNC: 43 U/L (ref 1–40)
BASOPHILS # BLD AUTO: 0.07 10*3/MM3 (ref 0–0.2)
BASOPHILS NFR BLD AUTO: 1.3 % (ref 0–1.5)
BILIRUB SERPL-MCNC: 0.4 MG/DL (ref 0.2–1.2)
BUN BLD-MCNC: 7 MG/DL (ref 8–23)
BUN/CREAT SERPL: 8.6 (ref 7–25)
CALCIUM SPEC-SCNC: 9.7 MG/DL (ref 8.6–10.5)
CHLORIDE SERPL-SCNC: 92 MMOL/L (ref 98–107)
CO2 SERPL-SCNC: 22.7 MMOL/L (ref 22–29)
CREAT BLD-MCNC: 0.81 MG/DL (ref 0.76–1.27)
DEPRECATED RDW RBC AUTO: 41.6 FL (ref 37–54)
EOSINOPHIL # BLD AUTO: 0.07 10*3/MM3 (ref 0–0.4)
EOSINOPHIL NFR BLD AUTO: 1.3 % (ref 0.3–6.2)
ERYTHROCYTE [DISTWIDTH] IN BLOOD BY AUTOMATED COUNT: 12.8 % (ref 12.3–15.4)
GFR SERPL CREATININE-BSD FRML MDRD: 96 ML/MIN/1.73
GLOBULIN UR ELPH-MCNC: 3 GM/DL
GLUCOSE BLD-MCNC: 75 MG/DL (ref 65–99)
HCT VFR BLD AUTO: 31.9 % (ref 37.5–51)
HGB BLD-MCNC: 11.3 G/DL (ref 13–17.7)
IMM GRANULOCYTES # BLD AUTO: 0.02 10*3/MM3 (ref 0–0.05)
IMM GRANULOCYTES NFR BLD AUTO: 0.4 % (ref 0–0.5)
LYMPHOCYTES # BLD AUTO: 1.4 10*3/MM3 (ref 0.7–3.1)
LYMPHOCYTES NFR BLD AUTO: 26.2 % (ref 19.6–45.3)
MCH RBC QN AUTO: 32 PG (ref 26.6–33)
MCHC RBC AUTO-ENTMCNC: 35.4 G/DL (ref 31.5–35.7)
MCV RBC AUTO: 90.4 FL (ref 79–97)
MONOCYTES # BLD AUTO: 1.01 10*3/MM3 (ref 0.1–0.9)
MONOCYTES NFR BLD AUTO: 18.9 % (ref 5–12)
NEUTROPHILS # BLD AUTO: 2.78 10*3/MM3 (ref 1.7–7)
NEUTROPHILS NFR BLD AUTO: 51.9 % (ref 42.7–76)
NRBC BLD AUTO-RTO: 0 /100 WBC (ref 0–0.2)
OSMOLALITY SERPL: 272 MOSM/KG (ref 280–301)
PLATELET # BLD AUTO: 339 10*3/MM3 (ref 140–450)
PMV BLD AUTO: 10.8 FL (ref 6–12)
POTASSIUM BLD-SCNC: 4.7 MMOL/L (ref 3.5–5.2)
PROT SERPL-MCNC: 7.8 G/DL (ref 6–8.5)
RBC # BLD AUTO: 3.53 10*6/MM3 (ref 4.14–5.8)
SODIUM BLD-SCNC: 129 MMOL/L (ref 136–145)
WBC NRBC COR # BLD: 5.35 10*3/MM3 (ref 3.4–10.8)

## 2019-12-17 PROCEDURE — 85025 COMPLETE CBC W/AUTO DIFF WBC: CPT

## 2019-12-17 PROCEDURE — 96372 THER/PROPH/DIAG INJ SC/IM: CPT | Performed by: FAMILY MEDICINE

## 2019-12-17 PROCEDURE — 83930 ASSAY OF BLOOD OSMOLALITY: CPT

## 2019-12-17 PROCEDURE — 99214 OFFICE O/P EST MOD 30 MIN: CPT | Performed by: FAMILY MEDICINE

## 2019-12-17 PROCEDURE — 86738 MYCOPLASMA ANTIBODY: CPT

## 2019-12-17 PROCEDURE — 80053 COMPREHEN METABOLIC PANEL: CPT

## 2019-12-17 RX ORDER — ALBUTEROL SULFATE 1.25 MG/3ML
1 SOLUTION RESPIRATORY (INHALATION) EVERY 6 HOURS PRN
Qty: 1 VIAL | Refills: 3 | Status: SHIPPED | OUTPATIENT
Start: 2019-12-17

## 2019-12-17 RX ORDER — PREDNISONE 10 MG/1
TABLET ORAL
Qty: 30 TABLET | Refills: 0 | Status: SHIPPED | OUTPATIENT
Start: 2019-12-17 | End: 2020-08-26

## 2019-12-17 RX ORDER — METHYLPREDNISOLONE SODIUM SUCCINATE 125 MG/2ML
125 INJECTION, POWDER, LYOPHILIZED, FOR SOLUTION INTRAMUSCULAR; INTRAVENOUS ONCE
Status: COMPLETED | OUTPATIENT
Start: 2019-12-17 | End: 2019-12-17

## 2019-12-17 RX ORDER — DOXYCYCLINE 50 MG/1
100 CAPSULE ORAL 2 TIMES DAILY
Qty: 40 CAPSULE | Refills: 0 | Status: SHIPPED | OUTPATIENT
Start: 2019-12-17 | End: 2020-08-26

## 2019-12-17 RX ORDER — ALBUTEROL SULFATE 90 UG/1
2 AEROSOL, METERED RESPIRATORY (INHALATION) EVERY 6 HOURS PRN
Qty: 1 INHALER | Refills: 3 | Status: SHIPPED | OUTPATIENT
Start: 2019-12-17 | End: 2020-08-26 | Stop reason: SDUPTHER

## 2019-12-17 RX ADMIN — METHYLPREDNISOLONE SODIUM SUCCINATE 125 MG: 125 INJECTION, POWDER, LYOPHILIZED, FOR SOLUTION INTRAMUSCULAR; INTRAVENOUS at 10:40

## 2019-12-17 NOTE — PATIENT INSTRUCTIONS
Please call Gastroenterology PAC Toby Saini for appt for alcohol fatty liver disease.  will give number. Thanks     Get labwork and chest x-ray    Start on prednisone tapering dose. 4 pills for 4 days 3 pills for 3 days 2 pills for 2 days and 1 pill for one day ten stop    Recheck in 2-3 weeks    Start on doxycycline 100 mg twice a day for 10 days

## 2019-12-18 ENCOUNTER — TELEPHONE (OUTPATIENT)
Dept: FAMILY MEDICINE CLINIC | Facility: CLINIC | Age: 64
End: 2019-12-18

## 2019-12-18 LAB
M PNEUMONIAE IGG ABS: <100 U/ML (ref 0–99)
M PNEUMONIAE IGM ABS: <770 U/ML (ref 0–769)

## 2019-12-18 RX ORDER — PANTOPRAZOLE SODIUM 40 MG/1
40 TABLET, DELAYED RELEASE ORAL DAILY
Qty: 30 TABLET | Refills: 3 | Status: SHIPPED | OUTPATIENT
Start: 2019-12-18 | End: 2019-12-22

## 2019-12-18 RX ORDER — LISINOPRIL 20 MG/1
20 TABLET ORAL DAILY
Qty: 30 TABLET | Refills: 3 | Status: SHIPPED | OUTPATIENT
Start: 2019-12-18 | End: 2019-12-22

## 2019-12-18 RX ORDER — ATORVASTATIN CALCIUM 20 MG/1
20 TABLET, FILM COATED ORAL DAILY
Qty: 30 TABLET | Refills: 3 | Status: SHIPPED | OUTPATIENT
Start: 2019-12-18 | End: 2019-12-22

## 2019-12-18 RX ORDER — METOPROLOL SUCCINATE 25 MG/1
25 TABLET, EXTENDED RELEASE ORAL DAILY
Qty: 30 TABLET | Refills: 3 | Status: SHIPPED | OUTPATIENT
Start: 2019-12-18 | End: 2019-12-22

## 2019-12-23 ENCOUNTER — TELEPHONE (OUTPATIENT)
Dept: FAMILY MEDICINE CLINIC | Facility: CLINIC | Age: 64
End: 2019-12-23

## 2020-01-02 ENCOUNTER — TELEPHONE (OUTPATIENT)
Dept: FAMILY MEDICINE CLINIC | Facility: CLINIC | Age: 65
End: 2020-01-02

## 2020-01-02 NOTE — TELEPHONE ENCOUNTER
----- Message from Toby Harrell MD sent at 12/17/2019 11:17 AM CST -----  Chest x-ray consistent with pt's cOPD> no active disease. Continue on abx prescribed    Recheck on next visit    Please advise pt to proceed to ER if symptoms worrisome or severe

## 2020-01-02 NOTE — TELEPHONE ENCOUNTER
----- Message from Toby Harrell MD sent at 12/22/2019 11:02 AM CST -----  Please call pt. Pt does not have working phone so you will have to contact is neighbor or friend    Pt's mycoplasma levels normal. He does not have an atypical pneumonia    He does have low sodium and hyponatremia. The concentration in his blood or serum osmolality is low.  Pt needs to come back and get his urine studies in clinic done. They have been ordered already    This will help determine cause of low sodium     Liver enzymes slightly elevated but likely due to his alcoholic hepatitis. He needs to followup with Gastroenterology and call for an appt    Recheck on next visit. Thanks

## 2020-04-27 ENCOUNTER — TELEPHONE (OUTPATIENT)
Dept: FAMILY MEDICINE CLINIC | Facility: CLINIC | Age: 65
End: 2020-04-27

## 2020-04-27 RX ORDER — METOPROLOL SUCCINATE 25 MG/1
25 TABLET, EXTENDED RELEASE ORAL DAILY
Qty: 30 TABLET | Refills: 3 | Status: SHIPPED | OUTPATIENT
Start: 2020-04-27 | End: 2020-04-28 | Stop reason: SDUPTHER

## 2020-04-27 RX ORDER — LISINOPRIL 20 MG/1
20 TABLET ORAL DAILY
Qty: 30 TABLET | Refills: 3 | Status: SHIPPED | OUTPATIENT
Start: 2020-04-27 | End: 2020-04-28 | Stop reason: SDUPTHER

## 2020-04-27 RX ORDER — ATORVASTATIN CALCIUM 20 MG/1
20 TABLET, FILM COATED ORAL DAILY
Qty: 30 TABLET | Refills: 3 | Status: SHIPPED | OUTPATIENT
Start: 2020-04-27 | End: 2020-04-28 | Stop reason: SDUPTHER

## 2020-04-27 RX ORDER — PANTOPRAZOLE SODIUM 40 MG/1
40 TABLET, DELAYED RELEASE ORAL DAILY
Qty: 30 TABLET | Refills: 3 | Status: SHIPPED | OUTPATIENT
Start: 2020-04-27 | End: 2020-04-28 | Stop reason: SDUPTHER

## 2020-04-28 RX ORDER — LISINOPRIL 20 MG/1
20 TABLET ORAL DAILY
Qty: 30 TABLET | Refills: 3 | Status: SHIPPED | OUTPATIENT
Start: 2020-04-28 | End: 2020-08-17

## 2020-04-28 RX ORDER — PANTOPRAZOLE SODIUM 40 MG/1
40 TABLET, DELAYED RELEASE ORAL DAILY
Qty: 30 TABLET | Refills: 3 | Status: SHIPPED | OUTPATIENT
Start: 2020-04-28 | End: 2020-08-17

## 2020-04-28 RX ORDER — METOPROLOL SUCCINATE 25 MG/1
25 TABLET, EXTENDED RELEASE ORAL DAILY
Qty: 30 TABLET | Refills: 3 | Status: SHIPPED | OUTPATIENT
Start: 2020-04-28 | End: 2020-08-17

## 2020-04-28 RX ORDER — ATORVASTATIN CALCIUM 20 MG/1
20 TABLET, FILM COATED ORAL DAILY
Qty: 30 TABLET | Refills: 3 | Status: SHIPPED | OUTPATIENT
Start: 2020-04-28 | End: 2020-08-17

## 2020-04-28 NOTE — TELEPHONE ENCOUNTER
Emily at [facility] called requesting a refill on patients medications. She stated that it was sent over but they did not receive it.     Pharmacy confirmed.

## 2020-08-17 RX ORDER — METOPROLOL SUCCINATE 25 MG/1
TABLET, EXTENDED RELEASE ORAL
Qty: 30 TABLET | Refills: 3 | Status: SHIPPED | OUTPATIENT
Start: 2020-08-17 | End: 2020-12-14

## 2020-08-17 RX ORDER — LISINOPRIL 20 MG/1
TABLET ORAL
Qty: 30 TABLET | Refills: 3 | Status: SHIPPED | OUTPATIENT
Start: 2020-08-17 | End: 2020-12-14

## 2020-08-17 RX ORDER — ATORVASTATIN CALCIUM 20 MG/1
TABLET, FILM COATED ORAL
Qty: 30 TABLET | Refills: 3 | Status: SHIPPED | OUTPATIENT
Start: 2020-08-17 | End: 2020-12-14

## 2020-08-17 RX ORDER — PANTOPRAZOLE SODIUM 40 MG/1
TABLET, DELAYED RELEASE ORAL
Qty: 30 TABLET | Refills: 3 | Status: SHIPPED | OUTPATIENT
Start: 2020-08-17 | End: 2020-12-14

## 2020-08-19 ENCOUNTER — TELEPHONE (OUTPATIENT)
Dept: FAMILY MEDICINE CLINIC | Facility: CLINIC | Age: 65
End: 2020-08-19

## 2020-08-19 NOTE — TELEPHONE ENCOUNTER
Pt was recently discharged form hospital and referred to home health with [facility] and they wanted to make sure  would follow. Made aware he would and that pt has follow up appointment tomorrow. Asked Monika to request records.

## 2020-08-26 ENCOUNTER — OFFICE VISIT (OUTPATIENT)
Dept: FAMILY MEDICINE CLINIC | Facility: CLINIC | Age: 65
End: 2020-08-26

## 2020-08-26 VITALS
HEIGHT: 65 IN | HEART RATE: 92 BPM | TEMPERATURE: 99 F | SYSTOLIC BLOOD PRESSURE: 100 MMHG | BODY MASS INDEX: 24.63 KG/M2 | DIASTOLIC BLOOD PRESSURE: 54 MMHG | OXYGEN SATURATION: 99 %

## 2020-08-26 DIAGNOSIS — Z00.00 ANNUAL PHYSICAL EXAM: ICD-10-CM

## 2020-08-26 DIAGNOSIS — Z00.00 GENERAL MEDICAL EXAMINATION: ICD-10-CM

## 2020-08-26 DIAGNOSIS — Z71.41 ALCOHOL ABUSE COUNSELING AND SURVEILLANCE: ICD-10-CM

## 2020-08-26 DIAGNOSIS — M25.561 RIGHT KNEE PAIN, UNSPECIFIED CHRONICITY: ICD-10-CM

## 2020-08-26 DIAGNOSIS — Z71.6 TOBACCO ABUSE COUNSELING: ICD-10-CM

## 2020-08-26 DIAGNOSIS — Z12.11 ENCOUNTER FOR SCREENING COLONOSCOPY: ICD-10-CM

## 2020-08-26 DIAGNOSIS — M25.461 EFFUSION OF RIGHT KNEE: ICD-10-CM

## 2020-08-26 DIAGNOSIS — F10.10 ALCOHOL ABUSE: ICD-10-CM

## 2020-08-26 DIAGNOSIS — M25.469 KNEE SWELLING: Primary | ICD-10-CM

## 2020-08-26 DIAGNOSIS — J44.9 CHRONIC OBSTRUCTIVE PULMONARY DISEASE, UNSPECIFIED COPD TYPE (HCC): ICD-10-CM

## 2020-08-26 DIAGNOSIS — Z02.83 ENCOUNTER FOR DRUG SCREENING: ICD-10-CM

## 2020-08-26 DIAGNOSIS — Z72.0 TOBACCO USER: ICD-10-CM

## 2020-08-26 DIAGNOSIS — I10 ESSENTIAL HYPERTENSION: ICD-10-CM

## 2020-08-26 DIAGNOSIS — E78.5 HYPERLIPIDEMIA, UNSPECIFIED HYPERLIPIDEMIA TYPE: ICD-10-CM

## 2020-08-26 DIAGNOSIS — R26.81 GAIT INSTABILITY: ICD-10-CM

## 2020-08-26 DIAGNOSIS — Z91.81 AT HIGH RISK FOR FALLS: ICD-10-CM

## 2020-08-26 PROCEDURE — 99214 OFFICE O/P EST MOD 30 MIN: CPT | Performed by: FAMILY MEDICINE

## 2020-08-26 RX ORDER — ALBUTEROL SULFATE 90 UG/1
2 AEROSOL, METERED RESPIRATORY (INHALATION) EVERY 6 HOURS PRN
Qty: 18 G | Refills: 3 | Status: SHIPPED | OUTPATIENT
Start: 2020-08-26

## 2020-08-26 RX ORDER — HYDROCODONE BITARTRATE AND ACETAMINOPHEN 5; 325 MG/1; MG/1
1 TABLET ORAL EVERY 6 HOURS PRN
Qty: 20 TABLET | Refills: 0 | Status: SHIPPED | OUTPATIENT
Start: 2020-08-26 | End: 2020-08-30

## 2020-08-26 RX ORDER — ALBUTEROL SULFATE 1.25 MG/3ML
1 SOLUTION RESPIRATORY (INHALATION) EVERY 6 HOURS PRN
Qty: 3 ML | Refills: 3 | Status: SHIPPED | OUTPATIENT
Start: 2020-08-26

## 2020-08-26 RX ORDER — CLINDAMYCIN HYDROCHLORIDE 300 MG/1
300 CAPSULE ORAL 4 TIMES DAILY
COMMUNITY
Start: 2020-08-18

## 2020-08-26 RX ORDER — HYDROCODONE BITARTRATE AND ACETAMINOPHEN 5; 325 MG/1; MG/1
TABLET ORAL
COMMUNITY
Start: 2020-08-18 | End: 2020-08-26 | Stop reason: SDUPTHER
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
## 2020-08-26 NOTE — PATIENT INSTRUCTIONS
Please call DR. Spain for an appt    Will refer to Orthopedic at [facility]    Also will refer to Dr. Farrell at [facility] for colonoscopy screening    Recheck in 4 weeks     Get labwork and urine drug screen and x-ray

## 2020-08-27 ENCOUNTER — TELEPHONE (OUTPATIENT)
Dept: FAMILY MEDICINE CLINIC | Facility: CLINIC | Age: 65
End: 2020-08-27

## 2020-08-27 NOTE — TELEPHONE ENCOUNTER
Spoke with April and she said pt stated he had an appointment coming up for X-ray and labs but could not remember with who. I informed her he does not have to have an appointment for those he should have got them done when he left his appointment yesterday and that he can come anytime to get them done. She stated she would let him know. I told her he was referred to ortho. 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

## 2020-08-27 NOTE — TELEPHONE ENCOUNTER
April Cutoff from [facility] called in regards to his right knee pain. She left  for you to return her call. [phone]

## 2020-08-31 ENCOUNTER — TELEPHONE (OUTPATIENT)
Dept: FAMILY MEDICINE CLINIC | Facility: CLINIC | Age: 65
End: 2020-08-31

## 2020-08-31 NOTE — TELEPHONE ENCOUNTER
Insurance denied PA for Flutic/salme Aer (Wixela) 500/50. Per  change to Breo Ellip 200 mg 1 puff daily. Pt needs to follow up with Pulmonologist.

## 2020-09-02 ENCOUNTER — LAB (OUTPATIENT)
Dept: LAB | Facility: HOSPITAL | Age: 65
End: 2020-09-02

## 2020-09-02 DIAGNOSIS — Z00.00 ANNUAL PHYSICAL EXAM: ICD-10-CM

## 2020-09-02 DIAGNOSIS — Z02.83 ENCOUNTER FOR DRUG SCREENING: ICD-10-CM

## 2020-09-02 DIAGNOSIS — Z12.11 ENCOUNTER FOR SCREENING COLONOSCOPY: ICD-10-CM

## 2020-09-02 DIAGNOSIS — Z00.00 GENERAL MEDICAL EXAMINATION: ICD-10-CM

## 2020-09-02 DIAGNOSIS — M25.461 EFFUSION OF RIGHT KNEE: ICD-10-CM

## 2020-09-02 DIAGNOSIS — M25.561 RIGHT KNEE PAIN, UNSPECIFIED CHRONICITY: ICD-10-CM

## 2020-09-02 DIAGNOSIS — M25.469 KNEE SWELLING: ICD-10-CM

## 2020-09-02 LAB
25(OH)D3 SERPL-MCNC: 28.9 NG/ML (ref 30–100)
ALBUMIN SERPL-MCNC: 4.2 G/DL (ref 3.5–5.2)
ALBUMIN/GLOB SERPL: 1.1 G/DL
ALP SERPL-CCNC: 86 U/L (ref 39–117)
ALT SERPL W P-5'-P-CCNC: 9 U/L (ref 1–41)
AMPHET+METHAMPHET UR QL: NEGATIVE
AMPHETAMINES UR QL: NEGATIVE
ANION GAP SERPL CALCULATED.3IONS-SCNC: 8.1 MMOL/L (ref 5–15)
AST SERPL-CCNC: 21 U/L (ref 1–40)
BARBITURATES UR QL SCN: NEGATIVE
BASOPHILS # BLD AUTO: 0.1 10*3/MM3 (ref 0–0.2)
BASOPHILS NFR BLD AUTO: 1.5 % (ref 0–1.5)
BENZODIAZ UR QL SCN: NEGATIVE
BILIRUB SERPL-MCNC: 0.4 MG/DL (ref 0–1.2)
BUN SERPL-MCNC: 7 MG/DL (ref 8–23)
BUN/CREAT SERPL: 9.3 (ref 7–25)
BUPRENORPHINE SERPL-MCNC: NEGATIVE NG/ML
CALCIUM SPEC-SCNC: 9.6 MG/DL (ref 8.6–10.5)
CANNABINOIDS SERPL QL: NEGATIVE
CHLORIDE SERPL-SCNC: 93 MMOL/L (ref 98–107)
CHOLEST SERPL-MCNC: 158 MG/DL (ref 0–200)
CO2 SERPL-SCNC: 24.9 MMOL/L (ref 22–29)
COCAINE UR QL: NEGATIVE
CREAT SERPL-MCNC: 0.75 MG/DL (ref 0.76–1.27)
CRP SERPL-MCNC: 2 MG/DL (ref 0–0.5)
DEPRECATED RDW RBC AUTO: 44.9 FL (ref 37–54)
EOSINOPHIL # BLD AUTO: 0.31 10*3/MM3 (ref 0–0.4)
EOSINOPHIL NFR BLD AUTO: 4.6 % (ref 0.3–6.2)
ERYTHROCYTE [DISTWIDTH] IN BLOOD BY AUTOMATED COUNT: 13.4 % (ref 12.3–15.4)
GFR SERPL CREATININE-BSD FRML MDRD: 105 ML/MIN/1.73
GLOBULIN UR ELPH-MCNC: 3.8 GM/DL
GLUCOSE SERPL-MCNC: 78 MG/DL (ref 65–99)
HBA1C MFR BLD: 5.1 % (ref 4.8–5.6)
HCT VFR BLD AUTO: 34.5 % (ref 37.5–51)
HDLC SERPL-MCNC: 55 MG/DL (ref 40–60)
HGB BLD-MCNC: 11.6 G/DL (ref 13–17.7)
IMM GRANULOCYTES # BLD AUTO: 0.02 10*3/MM3 (ref 0–0.05)
IMM GRANULOCYTES NFR BLD AUTO: 0.3 % (ref 0–0.5)
LDLC SERPL CALC-MCNC: 81 MG/DL (ref 0–100)
LDLC/HDLC SERPL: 1.48 {RATIO}
LYMPHOCYTES # BLD AUTO: 1.71 10*3/MM3 (ref 0.7–3.1)
LYMPHOCYTES NFR BLD AUTO: 25.3 % (ref 19.6–45.3)
MCH RBC QN AUTO: 31.1 PG (ref 26.6–33)
MCHC RBC AUTO-ENTMCNC: 33.6 G/DL (ref 31.5–35.7)
MCV RBC AUTO: 92.5 FL (ref 79–97)
METHADONE UR QL SCN: NEGATIVE
MONOCYTES # BLD AUTO: 0.69 10*3/MM3 (ref 0.1–0.9)
MONOCYTES NFR BLD AUTO: 10.2 % (ref 5–12)
NEUTROPHILS NFR BLD AUTO: 3.92 10*3/MM3 (ref 1.7–7)
NEUTROPHILS NFR BLD AUTO: 58.1 % (ref 42.7–76)
NRBC BLD AUTO-RTO: 0 /100 WBC (ref 0–0.2)
OPIATES UR QL: NEGATIVE
OXYCODONE UR QL SCN: NEGATIVE
PCP UR QL SCN: NEGATIVE
PLATELET # BLD AUTO: 322 10*3/MM3 (ref 140–450)
PMV BLD AUTO: 11.4 FL (ref 6–12)
POTASSIUM SERPL-SCNC: 4.8 MMOL/L (ref 3.5–5.2)
PROPOXYPH UR QL: NEGATIVE
PROT SERPL-MCNC: 8 G/DL (ref 6–8.5)
RBC # BLD AUTO: 3.73 10*6/MM3 (ref 4.14–5.8)
SODIUM SERPL-SCNC: 126 MMOL/L (ref 136–145)
T4 FREE SERPL-MCNC: 1.06 NG/DL (ref 0.93–1.7)
TRICYCLICS UR QL SCN: NEGATIVE
TRIGL SERPL-MCNC: 109 MG/DL (ref 0–150)
TSH SERPL DL<=0.05 MIU/L-ACNC: 0.88 UIU/ML (ref 0.27–4.2)
URATE SERPL-MCNC: 4.6 MG/DL (ref 3.4–7)
VLDLC SERPL-MCNC: 21.8 MG/DL (ref 5–40)
WBC # BLD AUTO: 6.75 10*3/MM3 (ref 3.4–10.8)

## 2020-09-02 PROCEDURE — 84439 ASSAY OF FREE THYROXINE: CPT

## 2020-09-02 PROCEDURE — 83036 HEMOGLOBIN GLYCOSYLATED A1C: CPT

## 2020-09-02 PROCEDURE — 80306 DRUG TEST PRSMV INSTRMNT: CPT

## 2020-09-02 PROCEDURE — 82306 VITAMIN D 25 HYDROXY: CPT

## 2020-09-02 PROCEDURE — 84550 ASSAY OF BLOOD/URIC ACID: CPT

## 2020-09-02 PROCEDURE — 80061 LIPID PANEL: CPT

## 2020-09-02 PROCEDURE — 86140 C-REACTIVE PROTEIN: CPT

## 2020-09-02 PROCEDURE — 80050 GENERAL HEALTH PANEL: CPT

## 2020-09-04 ENCOUNTER — TELEPHONE (OUTPATIENT)
Dept: FAMILY MEDICINE CLINIC | Facility: CLINIC | Age: 65
End: 2020-09-04

## 2020-09-04 NOTE — TELEPHONE ENCOUNTER
----- Message from Toby Harrell MD sent at 9/3/2020 11:00 AM CDT -----  Please let pt know     UDS negative. Pt was recently given prescription for Norco  for his knee pain     Thyroid studies normal     On CMP his sodium and chloride is low. And recommend pt see Nephrology for his hyponatremia. Recommend water restriction and also cut back on alcohol intake if possible.      Lipid panel stable     Uric acid levels normal     CRP slightly elevated at 2.00.  Indicating some type of inflammation in system.      Vitamin D is low and pt is to continue taking Vitamin D if taking already     On CBC hemoglobin  Stable     -recheck on next visit. Thanks

## 2020-09-04 NOTE — TELEPHONE ENCOUNTER
----- Message from Toby Harrell MD sent at 9/3/2020 10:29 AM CDT -----  X-ray of right knee shows degenerative changes. Pt adivsed to followup with Orthopedic regarding this.  Please have pt make appt.      Recheck on next visit. Thanks

## 2020-09-11 ENCOUNTER — TELEPHONE (OUTPATIENT)
Dept: FAMILY MEDICINE CLINIC | Facility: CLINIC | Age: 65
End: 2020-09-11

## 2020-09-16 ENCOUNTER — TELEPHONE (OUTPATIENT)
Dept: FAMILY MEDICINE CLINIC | Facility: CLINIC | Age: 65
End: 2020-09-16

## 2020-12-14 RX ORDER — PANTOPRAZOLE SODIUM 40 MG/1
TABLET, DELAYED RELEASE ORAL
Qty: 30 TABLET | Refills: 3 | Status: SHIPPED | OUTPATIENT
Start: 2020-12-14 | End: 2021-04-13

## 2020-12-14 RX ORDER — ATORVASTATIN CALCIUM 20 MG/1
TABLET, FILM COATED ORAL
Qty: 30 TABLET | Refills: 3 | Status: SHIPPED | OUTPATIENT
Start: 2020-12-14 | End: 2021-04-13

## 2020-12-14 RX ORDER — LISINOPRIL 20 MG/1
TABLET ORAL
Qty: 30 TABLET | Refills: 3 | Status: SHIPPED | OUTPATIENT
Start: 2020-12-14 | End: 2021-04-13

## 2020-12-14 RX ORDER — METOPROLOL SUCCINATE 25 MG/1
TABLET, EXTENDED RELEASE ORAL
Qty: 30 TABLET | Refills: 3 | Status: SHIPPED | OUTPATIENT
Start: 2020-12-14 | End: 2021-04-13

## 2021-04-13 RX ORDER — PANTOPRAZOLE SODIUM 40 MG/1
40 TABLET, DELAYED RELEASE ORAL EVERY MORNING
Qty: 30 TABLET | Refills: 0 | Status: SHIPPED | OUTPATIENT
Start: 2021-04-13 | End: 2021-05-13 | Stop reason: SDUPTHER

## 2021-04-13 RX ORDER — ATORVASTATIN CALCIUM 20 MG/1
20 TABLET, FILM COATED ORAL EVERY MORNING
Qty: 30 TABLET | Refills: 0 | Status: SHIPPED | OUTPATIENT
Start: 2021-04-13 | End: 2021-05-13 | Stop reason: SDUPTHER

## 2021-04-13 RX ORDER — LISINOPRIL 20 MG/1
20 TABLET ORAL EVERY MORNING
Qty: 30 TABLET | Refills: 0 | Status: SHIPPED | OUTPATIENT
Start: 2021-04-13 | End: 2021-05-13 | Stop reason: SDUPTHER

## 2021-04-13 RX ORDER — METOPROLOL SUCCINATE 25 MG/1
25 TABLET, EXTENDED RELEASE ORAL EVERY MORNING
Qty: 30 TABLET | Refills: 0 | Status: SHIPPED | OUTPATIENT
Start: 2021-04-13 | End: 2021-05-13 | Stop reason: SDUPTHER

## 2021-05-13 RX ORDER — METOPROLOL SUCCINATE 25 MG/1
TABLET, EXTENDED RELEASE ORAL
Qty: 30 TABLET | Refills: 0 | Status: SHIPPED | OUTPATIENT
Start: 2021-05-13 | End: 2021-06-11

## 2021-05-13 RX ORDER — PANTOPRAZOLE SODIUM 40 MG/1
TABLET, DELAYED RELEASE ORAL
Qty: 30 TABLET | Refills: 0 | Status: SHIPPED | OUTPATIENT
Start: 2021-05-13 | End: 2021-06-14

## 2021-05-13 RX ORDER — LISINOPRIL 20 MG/1
TABLET ORAL
Qty: 30 TABLET | Refills: 0 | Status: SHIPPED | OUTPATIENT
Start: 2021-05-13 | End: 2021-06-11

## 2021-05-13 RX ORDER — ATORVASTATIN CALCIUM 20 MG/1
TABLET, FILM COATED ORAL
Qty: 30 TABLET | Refills: 0 | Status: SHIPPED | OUTPATIENT
Start: 2021-05-13 | End: 2021-06-11

## 2021-06-11 RX ORDER — METOPROLOL SUCCINATE 25 MG/1
TABLET, EXTENDED RELEASE ORAL
Qty: 30 TABLET | Refills: 0 | Status: SHIPPED | OUTPATIENT
Start: 2021-06-11 | End: 2021-07-12 | Stop reason: SDUPTHER

## 2021-06-11 RX ORDER — ATORVASTATIN CALCIUM 20 MG/1
TABLET, FILM COATED ORAL
Qty: 30 TABLET | Refills: 0 | Status: SHIPPED | OUTPATIENT
Start: 2021-06-11 | End: 2021-07-12 | Stop reason: SDUPTHER

## 2021-06-11 RX ORDER — LISINOPRIL 20 MG/1
TABLET ORAL
Qty: 30 TABLET | Refills: 0 | Status: SHIPPED | OUTPATIENT
Start: 2021-06-11 | End: 2021-07-12 | Stop reason: SDUPTHER

## 2021-06-14 RX ORDER — PANTOPRAZOLE SODIUM 40 MG/1
TABLET, DELAYED RELEASE ORAL
Qty: 30 TABLET | Refills: 0 | Status: SHIPPED | OUTPATIENT
Start: 2021-06-14 | End: 2021-07-12 | Stop reason: SDUPTHER

## 2021-07-12 RX ORDER — ATORVASTATIN CALCIUM 20 MG/1
TABLET, FILM COATED ORAL
Qty: 30 TABLET | Refills: 0 | Status: SHIPPED | OUTPATIENT
Start: 2021-07-12 | End: 2021-08-11

## 2021-07-12 RX ORDER — METOPROLOL SUCCINATE 25 MG/1
TABLET, EXTENDED RELEASE ORAL
Qty: 30 TABLET | Refills: 0 | Status: SHIPPED | OUTPATIENT
Start: 2021-07-12 | End: 2021-08-11

## 2021-07-12 RX ORDER — LISINOPRIL 20 MG/1
TABLET ORAL
Qty: 30 TABLET | Refills: 0 | Status: SHIPPED | OUTPATIENT
Start: 2021-07-12 | End: 2021-08-11

## 2021-07-12 RX ORDER — PANTOPRAZOLE SODIUM 40 MG/1
TABLET, DELAYED RELEASE ORAL
Qty: 30 TABLET | Refills: 0 | Status: SHIPPED | OUTPATIENT
Start: 2021-07-12 | End: 2021-08-11

## 2021-08-11 RX ORDER — LISINOPRIL 20 MG/1
TABLET ORAL
Qty: 30 TABLET | Refills: 0 | Status: SHIPPED | OUTPATIENT
Start: 2021-08-11 | End: 2021-09-09

## 2021-08-11 RX ORDER — METOPROLOL SUCCINATE 25 MG/1
TABLET, EXTENDED RELEASE ORAL
Qty: 30 TABLET | Refills: 0 | Status: SHIPPED | OUTPATIENT
Start: 2021-08-11 | End: 2021-09-09

## 2021-08-11 RX ORDER — ATORVASTATIN CALCIUM 20 MG/1
TABLET, FILM COATED ORAL
Qty: 30 TABLET | Refills: 0 | Status: SHIPPED | OUTPATIENT
Start: 2021-08-11 | End: 2021-09-09

## 2021-08-11 RX ORDER — PANTOPRAZOLE SODIUM 40 MG/1
TABLET, DELAYED RELEASE ORAL
Qty: 30 TABLET | Refills: 0 | Status: SHIPPED | OUTPATIENT
Start: 2021-08-11 | End: 2021-09-10

## 2021-08-11 NOTE — TELEPHONE ENCOUNTER
Rx Refill Note  Requested Prescriptions     Pending Prescriptions Disp Refills   • pantoprazole (PROTONIX) 40 MG EC tablet [Pharmacy Med Name: pantoprazole 40 mg tablet,delayed release] 30 tablet 0     Sig: TAKE ONE TABLET BY MOUTH EVERY MORNING   • metoprolol succinate XL (TOPROL-XL) 25 MG 24 hr tablet [Pharmacy Med Name: metoprolol succinate ER 25 mg tablet,extended release 24 hr] 30 tablet 0     Sig: TAKE ONE TABLET BY MOUTH EVERY MORNING   • lisinopril (PRINIVIL,ZESTRIL) 20 MG tablet [Pharmacy Med Name: lisinopril 20 mg tablet] 30 tablet 0     Sig: TAKE ONE TABLET BY MOUTH EVERY MORNING   • atorvastatin (LIPITOR) 20 MG tablet [Pharmacy Med Name: atorvastatin 20 mg tablet] 30 tablet 0     Sig: TAKE ONE TABLET BY MOUTH EVERY MORNING      Last office visit with prescribing clinician: 8/26/2020      Next office visit with prescribing clinician: Visit date not found   {TIP  Encounter:    Beta-Blockers Protocol [id number]/11/2021 02:11 PM   Recent or future visit with authorizing provider       Patient has not been seen in almost a year.      {TIP  Please add Last Relevant Lab Date if appropriate   {TIP  Is Refill Pharmacy correct? yes  Paty Michele LPN  08/11/21, 15:33 CDT

## 2021-09-09 RX ORDER — METOPROLOL SUCCINATE 25 MG/1
TABLET, EXTENDED RELEASE ORAL
Qty: 30 TABLET | Refills: 0 | Status: SHIPPED | OUTPATIENT
Start: 2021-09-09 | End: 2021-10-11

## 2021-09-09 RX ORDER — LISINOPRIL 20 MG/1
TABLET ORAL
Qty: 30 TABLET | Refills: 0 | Status: SHIPPED | OUTPATIENT
Start: 2021-09-09 | End: 2021-10-11

## 2021-09-09 RX ORDER — ATORVASTATIN CALCIUM 20 MG/1
TABLET, FILM COATED ORAL
Qty: 30 TABLET | Refills: 0 | Status: SHIPPED | OUTPATIENT
Start: 2021-09-09 | End: 2021-10-11

## 2021-09-09 NOTE — TELEPHONE ENCOUNTER
Rx Refill Note  Requested Prescriptions     Pending Prescriptions Disp Refills   • metoprolol succinate XL (TOPROL-XL) 25 MG 24 hr tablet [Pharmacy Med Name: metoprolol succinate ER 25 mg tablet,extended release 24 hr] 30 tablet 0     Sig: TAKE ONE TABLET BY MOUTH EVERY MORNING   • lisinopril (PRINIVIL,ZESTRIL) 20 MG tablet [Pharmacy Med Name: lisinopril 20 mg tablet] 30 tablet 0     Sig: TAKE ONE TABLET BY MOUTH EVERY MORNING   • atorvastatin (LIPITOR) 20 MG tablet [Pharmacy Med Name: atorvastatin 20 mg tablet] 30 tablet 0     Sig: TAKE ONE TABLET BY MOUTH EVERY MORNING      Last office visit with prescribing clinician: 8/26/2020      Next office visit with prescribing clinician: Visit date not found   {TIP  Encounters:      PT HAS NOT BEEN SEEN SINCE 8/26/20. HE HAS BEEN TO ER MULTIPLE TIMES.    Beta-Blockers Protocol [id number]/09/2021 09:20 AM   BP under 140/90 in past year Protocol Details    Recent or future visit with authorizing provider      ACE Inhibitors Protocol [id number]/09/2021 09:20 AM   Normal serum potassium in past 12 months Protocol Details    Blood pressure on record     GFR greater than 30 mL/min in past 12 months     Recent or future visit with authorizing provider      HMG-CoA Reductase Inhibitors (Statins) Protocol [id number]/09/2021 09:20 AM   Lipid panel in past 12 months Protocol Details    ALK Phos in past 12 months     ALT in past 12 months     AST in past 12 months     Recent or future visit with authorizing provider          {TIP  Please add Last Relevant Lab Date if appropriate  {TIP  Is Refill Pharmacy correct? YES  Paty Michele LPN  09/09/21, 09:36 CDT

## 2021-09-10 RX ORDER — PANTOPRAZOLE SODIUM 40 MG/1
TABLET, DELAYED RELEASE ORAL
Qty: 30 TABLET | Refills: 0 | Status: SHIPPED | OUTPATIENT
Start: 2021-09-10 | End: 2021-10-11

## 2021-09-10 NOTE — TELEPHONE ENCOUNTER
Rx Refill Note  Requested Prescriptions     Pending Prescriptions Disp Refills   • pantoprazole (PROTONIX) 40 MG EC tablet [Pharmacy Med Name: pantoprazole 40 mg tablet,delayed release] 30 tablet 0     Sig: TAKE ONE TABLET BY MOUTH EVERY MORNING      Last office visit with prescribing clinician: 8/26/2020      Next office visit with prescribing clinician: Visit date not found   {TIP  Encounters:    PT HAS NOT BEEN SEEN IN OVER A YEAR.    Proton Pump Inhibitors Protocol Zojetp78/10/2021 08:03 AM   GFR>30 ml/min in past year    Recent or future visit with authorizing provider         {TIP  Please add Last Relevant Lab Date if appropriate  {TIP  Is Refill Pharmacy correct? YES  Paty Michele LPN  09/10/21, 08:23 CDT

## 2021-10-11 RX ORDER — PANTOPRAZOLE SODIUM 40 MG/1
TABLET, DELAYED RELEASE ORAL
Qty: 30 TABLET | Refills: 0 | Status: SHIPPED | OUTPATIENT
Start: 2021-10-11

## 2021-10-11 RX ORDER — METOPROLOL SUCCINATE 25 MG/1
TABLET, EXTENDED RELEASE ORAL
Qty: 30 TABLET | Refills: 0 | Status: SHIPPED | OUTPATIENT
Start: 2021-10-11

## 2021-10-11 RX ORDER — LISINOPRIL 20 MG/1
TABLET ORAL
Qty: 30 TABLET | Refills: 0 | Status: SHIPPED | OUTPATIENT
Start: 2021-10-11

## 2021-10-11 RX ORDER — ATORVASTATIN CALCIUM 20 MG/1
TABLET, FILM COATED ORAL
Qty: 30 TABLET | Refills: 0 | Status: SHIPPED | OUTPATIENT
Start: 2021-10-11

## 2021-10-11 NOTE — TELEPHONE ENCOUNTER
Rx Refill Note  Requested Prescriptions     Pending Prescriptions Disp Refills   • pantoprazole (PROTONIX) 40 MG EC tablet [Pharmacy Med Name: pantoprazole 40 mg tablet,delayed release] 30 tablet 0     Sig: TAKE ONE TABLET BY MOUTH EVERY MORNING   • metoprolol succinate XL (TOPROL-XL) 25 MG 24 hr tablet [Pharmacy Med Name: metoprolol succinate ER 25 mg tablet,extended release 24 hr] 30 tablet 0     Sig: TAKE ONE TABLET BY MOUTH EVERY MORNING   • lisinopril (PRINIVIL,ZESTRIL) 20 MG tablet [Pharmacy Med Name: lisinopril 20 mg tablet] 30 tablet 0     Sig: TAKE ONE TABLET BY MOUTH EVERY MORNING   • atorvastatin (LIPITOR) 20 MG tablet [Pharmacy Med Name: atorvastatin 20 mg tablet] 30 tablet 0     Sig: TAKE ONE TABLET BY MOUTH EVERY MORNING      Last office visit with prescribing clinician: 8/26/2020      Next office visit with prescribing clinician: Visit date not found   {TIP  Encounters:     Proton Pump Inhibitors Protocol Failed 10/11/2021 11:27 AM    GFR>30 ml/min in past year    Recent or future visit with authorizing provider           Beta-Blockers Protocol Failed 10/11/2021 11:27 AM   Protocol Details  BP under 140/90 in past year    Recent or future visit with authorizing provider           ACE Inhibitors Protocol Failed 10/11/2021 11:27 AM   Protocol Details  Normal serum potassium in past 12 months    Blood pressure on record    GFR greater than 30 mL/min in past 12 months         HMG-CoA Reductase Inhibitors (Statins) Protocol Failed 10/11/2021 11:27 AM   Protocol Details  Lipid panel in past 12 months    ALK Phos in past 12 months    ALT in past 12 months    AST in past 12 months    Recent or future visit with authorizing provider          PT HAS NOT BEE SEEN IN OVER YEAR. LAST APPT WAS ON 8/26/2020.  {TIP  Please add Last Relevant Lab Date if appropriate   {TIP  Is Refill Pharmacy correct?  Paty Michele LPN  10/11/21, 13:24 CDT